# Patient Record
Sex: MALE | Race: WHITE | NOT HISPANIC OR LATINO
[De-identification: names, ages, dates, MRNs, and addresses within clinical notes are randomized per-mention and may not be internally consistent; named-entity substitution may affect disease eponyms.]

---

## 2021-12-03 ENCOUNTER — RESULT REVIEW (OUTPATIENT)
Age: 71
End: 2021-12-03

## 2021-12-13 PROBLEM — Z00.00 ENCOUNTER FOR PREVENTIVE HEALTH EXAMINATION: Status: ACTIVE | Noted: 2021-12-13

## 2021-12-15 ENCOUNTER — APPOINTMENT (OUTPATIENT)
Dept: SURGERY | Facility: CLINIC | Age: 71
End: 2021-12-15
Payer: COMMERCIAL

## 2021-12-15 VITALS
HEART RATE: 71 BPM | HEIGHT: 72 IN | TEMPERATURE: 97.1 F | DIASTOLIC BLOOD PRESSURE: 68 MMHG | WEIGHT: 175 LBS | SYSTOLIC BLOOD PRESSURE: 111 MMHG | BODY MASS INDEX: 23.7 KG/M2

## 2021-12-15 DIAGNOSIS — N40.0 BENIGN PROSTATIC HYPERPLASIA WITHOUT LOWER URINARY TRACT SYMPMS: ICD-10-CM

## 2021-12-15 DIAGNOSIS — Z86.79 PERSONAL HISTORY OF OTHER DISEASES OF THE CIRCULATORY SYSTEM: ICD-10-CM

## 2021-12-15 DIAGNOSIS — Z87.438 PERSONAL HISTORY OF OTHER DISEASES OF MALE GENITAL ORGANS: ICD-10-CM

## 2021-12-15 DIAGNOSIS — Z87.19 PERSONAL HISTORY OF OTHER DISEASES OF THE DIGESTIVE SYSTEM: ICD-10-CM

## 2021-12-15 DIAGNOSIS — K80.20 CALCULUS OF GALLBLADDER W/OUT CHOLECYSTITIS W/OUT OBSTRUCTION: ICD-10-CM

## 2021-12-15 DIAGNOSIS — Z87.39 PERSONAL HISTORY OF OTHER DISEASES OF THE MUSCULOSKELETAL SYSTEM AND CONNECTIVE TISSUE: ICD-10-CM

## 2021-12-15 DIAGNOSIS — Z86.59 PERSONAL HISTORY OF OTHER MENTAL AND BEHAVIORAL DISORDERS: ICD-10-CM

## 2021-12-15 PROCEDURE — 99204 OFFICE O/P NEW MOD 45 MIN: CPT

## 2021-12-15 RX ORDER — IPRATROPIUM BROMIDE 42 UG/1
SPRAY NASAL
Refills: 0 | Status: ACTIVE | COMMUNITY

## 2021-12-15 RX ORDER — FLUTICASONE PROPIONATE 50 MCG
50 SPRAY, SUSPENSION NASAL
Refills: 0 | Status: ACTIVE | COMMUNITY

## 2021-12-15 RX ORDER — CLONAZEPAM 1 MG/1
1 TABLET ORAL
Refills: 0 | Status: ACTIVE | COMMUNITY

## 2021-12-15 RX ORDER — ATORVASTATIN CALCIUM 80 MG/1
80 TABLET, FILM COATED ORAL
Refills: 0 | Status: ACTIVE | COMMUNITY

## 2021-12-15 NOTE — HISTORY OF PRESENT ILLNESS
[de-identified] : 70 yo M here for evaluation of sigmoid colon invasive moderately differentiated adenocarcinoma\par \par CT A/P: Findings suggest an eccentric mass within the proximal sigmoid colon, measuring approximately 4.0 cm in length and 0.9 cm in width.  Probable left lobe hepatic cysts [de-identified] : Patient has h/o chronic pain for which he takes fentanyl and hydrocodone.  He has chronic GI symptoms and can go days without eating or having a bowel movement.  He c/o bloating and constipation with intermittent rectal bleeding. Prior to this most recent c-scope patient had one in 2016 but prep was insufficient.  Per patients  patient required 2 days of prep to have good enough result for the colonoscopy.   C-scope biopsy proven  invasive moderately differentiated adenocarcinoma

## 2021-12-15 NOTE — PHYSICAL EXAM
[Normal Rate and Rhythm] : normal rate and rhythm [Abdominal Masses] : No abdominal masses [No Rash or Lesion] : No rash or lesion [Alert] : alert [Calm] : calm [de-identified] : NAD, well appearing [de-identified] : soft, NT/ND

## 2021-12-15 NOTE — CONSULT LETTER
[Dear  ___] : Dear  [unfilled], [( Thank you for referring [unfilled] for consultation for _____ )] : Thank you for referring [unfilled] for consultation for [unfilled] [Please see my note below.] : Please see my note below. [Consult Closing:] : Thank you very much for allowing me to participate in the care of this patient.  If you have any questions, please do not hesitate to contact me. [Sincerely,] : Sincerely, [FreeTextEntry3] : Mercedes Hamilton

## 2022-01-06 ENCOUNTER — APPOINTMENT (OUTPATIENT)
Dept: SURGERY | Facility: HOSPITAL | Age: 72
End: 2022-01-06

## 2022-01-24 ENCOUNTER — LABORATORY RESULT (OUTPATIENT)
Age: 72
End: 2022-01-24

## 2022-01-24 ENCOUNTER — APPOINTMENT (OUTPATIENT)
Dept: HEMATOLOGY ONCOLOGY | Facility: CLINIC | Age: 72
End: 2022-01-24
Payer: COMMERCIAL

## 2022-01-24 VITALS
SYSTOLIC BLOOD PRESSURE: 109 MMHG | HEART RATE: 70 BPM | BODY MASS INDEX: 23.3 KG/M2 | WEIGHT: 172 LBS | HEIGHT: 72 IN | RESPIRATION RATE: 18 BRPM | OXYGEN SATURATION: 18 % | TEMPERATURE: 98.7 F | DIASTOLIC BLOOD PRESSURE: 66 MMHG

## 2022-01-24 DIAGNOSIS — Z80.8 FAMILY HISTORY OF MALIGNANT NEOPLASM OF OTHER ORGANS OR SYSTEMS: ICD-10-CM

## 2022-01-24 DIAGNOSIS — Z87.891 PERSONAL HISTORY OF NICOTINE DEPENDENCE: ICD-10-CM

## 2022-01-24 PROCEDURE — 99205 OFFICE O/P NEW HI 60 MIN: CPT

## 2022-01-24 RX ORDER — DICYCLOMINE HYDROCHLORIDE 10 MG/1
10 CAPSULE ORAL
Refills: 0 | Status: DISCONTINUED | COMMUNITY
End: 2022-01-24

## 2022-01-26 ENCOUNTER — APPOINTMENT (OUTPATIENT)
Dept: SURGERY | Facility: CLINIC | Age: 72
End: 2022-01-26
Payer: COMMERCIAL

## 2022-01-26 VITALS — TEMPERATURE: 97.7 F | HEART RATE: 63 BPM | DIASTOLIC BLOOD PRESSURE: 63 MMHG | SYSTOLIC BLOOD PRESSURE: 106 MMHG

## 2022-01-26 LAB
CEA SERPL-MCNC: 0.9 NG/ML
DEPRECATED KAPPA LC FREE/LAMBDA SER: 1.38 RATIO
FOLATE SERPL-MCNC: >20 NG/ML
KAPPA LC CSF-MCNC: 1.75 MG/DL
KAPPA LC SERPL-MCNC: 2.42 MG/DL
VIT B12 SERPL-MCNC: 654 PG/ML

## 2022-01-26 PROCEDURE — 99024 POSTOP FOLLOW-UP VISIT: CPT

## 2022-01-26 NOTE — PHYSICAL EXAM
[JVD] : no jugular venous distention  [Normal Breath Sounds] : Normal breath sounds [Normal Heart Sounds] : normal heart sounds [Abdominal Masses] : No abdominal masses [Abdomen Tenderness] : ~T ~M No abdominal tenderness [de-identified] : NAD, well appearing [de-identified] : soft, NT/NDm incisions well healed

## 2022-01-26 NOTE — CONSULT LETTER
[Dear  ___] : Dear  [unfilled], [Courtesy Letter:] : I had the pleasure of seeing your patient, [unfilled], in my office today. [Please see my note below.] : Please see my note below. [Consult Closing:] : Thank you very much for allowing me to participate in the care of this patient.  If you have any questions, please do not hesitate to contact me. [Sincerely,] : Sincerely, [FreeTextEntry1] : He is doing well after his sigmoid colectomy and will follow up again in 2 months.   [FreeTextEntry3] : Mercedes Hamilton

## 2022-01-26 NOTE — HISTORY OF PRESENT ILLNESS
[de-identified] : 71-year-old male s/p sigmoid colectomy s/p initial consultation with Dr. Patel for stage IIa adenocarcinoma of the colon. \par s/p sigmoid colectomy on January 6, 2022 which showed a Stage IIB moderately differentiated adenocarcinoma measuring 2.5 cm with invasion through the muscularis propria into pericolorectal tissue (T3N0Mx) with mismatch repair proteins results showing intact nuclear expression. [de-identified] : He reports that since surgery he has been feeling tired but otherwise feels well.  His bowel movements are improved from his usual state of chronic constipation.  He is ambulating without difficulty and has resumed activities of daily living.  Tolerating a regular diet.  Denies fevers/chills or abdominal pain

## 2022-01-28 NOTE — ASSESSMENT
[FreeTextEntry1] : This a very pleasant 71-year-old gentleman who was recently found to have a stage IIA (T3, N0, M0) resected  colon cancer on 1/6/2022 with an intact MMR panel with a low probability of MSI-H and no evident high risk features.  Given this finding in conjunction with his age and comorbidities it is not likely that he will get substantial benefit from adjuvant chemotherapy.  While single agent capecitabine may be considered the overall benefit is likely to be well short of 5%, especially in the 70 and over age group.  In conjunction with his comorbidities it is likely to be a poor risk to benefit ratio.  I will obtain the appropriate oncological lab work and I will review his pathology personally.  He will return in 3 weeks for follow-up and for further recommendations.  His next scans will be planned for the March–April timeframe.\par \par Thank you very much for allow me to participate in this gentlemen's medical care.  Should you have any questions or concerns please not hesitate to call me directly.

## 2022-01-28 NOTE — REVIEW OF SYSTEMS
[Chest Pain] : chest pain [Constipation] : constipation [Negative] : Allergic/Immunologic [FreeTextEntry5] : Associated with coughing or sneezing, resolved. [FreeTextEntry7] : Chronic secondary to analgesia

## 2022-01-28 NOTE — HISTORY OF PRESENT ILLNESS
[T: ___] : T[unfilled] [N: ___] : N[unfilled] [AJCC Stage: ____] : AJCC Stage: [unfilled] [de-identified] : Mr. Hever Zimmerman is a 71-year-old male who is referred by Dr. Mercedes Hamilton for initial consultation for stage IIa adenocarcinoma of the colon.  \par Mr. Hernández was referred for colonoscopy in December 2021 to evaluate rectal bleeding.  he reports that prior colonoscopy in 2016 was inconclusive because of insufficient prep.  Colonoscopy on December 3, 2021 revealed a small nodular mass in the sigmoid colon at 25 cm.  Pathology confirmed a invasive moderately differentiated adenocarcinoma.  CT of the abdomen pelvis on December 3, 2021 a eccentric mass within the proximal sigmoid colon measuring approximately 4 cm in length and probable  left lobe hepatic cysts.  CT of the chest on January 3, 2022 showed fibrocalcific changes at the right apex with stable fibrotic nodular density which were unchanged since prior CT chest from June 2017 He underwent sigmoidectomy on January 6, 2022 showed a Stage IIB  moderately differentiated adenocarcinoma measuring 2.5 cm with invasion through the muscularis propria into pericolorectal tissue (T3N0Mx) with mismatch repair proteins results showing intact nuclear expression.  He reports history of chronic constipation from pain medication used for bone degeneration.  He is feeling well but had episodes of chest pain associated with coughing or sneezing which resolved.  [0 - No Distress] : Distress Level: 0

## 2022-02-15 ENCOUNTER — APPOINTMENT (OUTPATIENT)
Dept: HEMATOLOGY ONCOLOGY | Facility: CLINIC | Age: 72
End: 2022-02-15
Payer: COMMERCIAL

## 2022-02-15 VITALS
RESPIRATION RATE: 18 BRPM | DIASTOLIC BLOOD PRESSURE: 78 MMHG | HEIGHT: 72 IN | SYSTOLIC BLOOD PRESSURE: 126 MMHG | TEMPERATURE: 97.6 F | OXYGEN SATURATION: 99 % | HEART RATE: 64 BPM | WEIGHT: 172 LBS | BODY MASS INDEX: 23.3 KG/M2

## 2022-02-15 LAB
ALBUMIN MFR SERPL ELPH: 54.4 %
ALBUMIN SERPL-MCNC: 3.8 G/DL
ALBUMIN/GLOB SERPL: 1.2 RATIO
ALPHA1 GLOB MFR SERPL ELPH: 6.6 %
ALPHA1 GLOB SERPL ELPH-MCNC: 0.5 G/DL
ALPHA2 GLOB MFR SERPL ELPH: 14.7 %
ALPHA2 GLOB SERPL ELPH-MCNC: 1 G/DL
B-GLOBULIN MFR SERPL ELPH: 12.2 %
B-GLOBULIN SERPL ELPH-MCNC: 0.8 G/DL
DEPRECATED KAPPA LC FREE/LAMBDA SER: 1.38 RATIO
GAMMA GLOB FLD ELPH-MCNC: 0.8 G/DL
GAMMA GLOB MFR SERPL ELPH: 12.1 %
IGA SER QL IEP: 207 MG/DL
IGG SER QL IEP: 842 MG/DL
IGM SER QL IEP: 71 MG/DL
INTERPRETATION SERPL IEP-IMP: NORMAL
KAPPA LC CSF-MCNC: 1.75 MG/DL
KAPPA LC SERPL-MCNC: 2.42 MG/DL
M PROTEIN SPEC IFE-MCNC: NORMAL
METHYLMALONATE SERPL-SCNC: 217 NMOL/L
PROT SERPL-MCNC: 6.9 G/DL
PROT SERPL-MCNC: 6.9 G/DL

## 2022-02-15 PROCEDURE — 99214 OFFICE O/P EST MOD 30 MIN: CPT

## 2022-03-23 ENCOUNTER — APPOINTMENT (OUTPATIENT)
Dept: SURGERY | Facility: CLINIC | Age: 72
End: 2022-03-23
Payer: COMMERCIAL

## 2022-03-23 VITALS
TEMPERATURE: 96.7 F | HEART RATE: 59 BPM | SYSTOLIC BLOOD PRESSURE: 107 MMHG | WEIGHT: 170 LBS | DIASTOLIC BLOOD PRESSURE: 68 MMHG | BODY MASS INDEX: 23.03 KG/M2 | HEIGHT: 72 IN

## 2022-03-23 DIAGNOSIS — Z85.038 PERSONAL HISTORY OF OTHER MALIGNANT NEOPLASM OF LARGE INTESTINE: ICD-10-CM

## 2022-03-23 PROCEDURE — 99024 POSTOP FOLLOW-UP VISIT: CPT

## 2022-03-23 NOTE — HISTORY OF PRESENT ILLNESS
[de-identified] : 71-year-old male s/p sigmoid colectomy on 1/6 for  Stage IIB moderately differentiated adenocarcinoma (T3N0Mx).  Here for 2 month follow up [de-identified] : He is doing well from a surgical standpoint.  S/o neck pain requiring additional pain medication.  He denies abdominal pain.  Has his usual on and off constipation.

## 2022-03-23 NOTE — CONSULT LETTER
[Dear  ___] : Dear  [unfilled], [Courtesy Letter:] : I had the pleasure of seeing your patient, [unfilled], in my office today. [Please see my note below.] : Please see my note below. [Consult Closing:] : Thank you very much for allowing me to participate in the care of this patient.  If you have any questions, please do not hesitate to contact me. [Sincerely,] : Sincerely, [FreeTextEntry1] : I saw Mr. Zimmerman today for his 2 month follow up following his robotic colectomy in January.  He is doing well and has follow up with Dr. Patel at the end of the month.   [FreeTextEntry3] : Mercedes Hamilton

## 2022-03-23 NOTE — PHYSICAL EXAM
[JVD] : no jugular venous distention  [Normal Breath Sounds] : Normal breath sounds [Normal Rate and Rhythm] : normal rate and rhythm [No Rash or Lesion] : No rash or lesion [Calm] : calm [de-identified] : NAD, well appearing [de-identified] : soft, NT/ND, incisions well healed

## 2022-03-29 ENCOUNTER — APPOINTMENT (OUTPATIENT)
Dept: HEMATOLOGY ONCOLOGY | Facility: CLINIC | Age: 72
End: 2022-03-29
Payer: COMMERCIAL

## 2022-03-29 VITALS
HEIGHT: 72 IN | WEIGHT: 170 LBS | TEMPERATURE: 97.9 F | RESPIRATION RATE: 18 BRPM | OXYGEN SATURATION: 97 % | SYSTOLIC BLOOD PRESSURE: 99 MMHG | DIASTOLIC BLOOD PRESSURE: 59 MMHG | BODY MASS INDEX: 23.03 KG/M2 | HEART RATE: 56 BPM

## 2022-03-29 DIAGNOSIS — K59.00 CONSTIPATION, UNSPECIFIED: ICD-10-CM

## 2022-03-29 PROCEDURE — 99214 OFFICE O/P EST MOD 30 MIN: CPT | Mod: 25

## 2022-03-29 PROCEDURE — 36415 COLL VENOUS BLD VENIPUNCTURE: CPT

## 2022-04-11 PROBLEM — K59.00 CONSTIPATION: Status: ACTIVE | Noted: 2022-04-11

## 2022-04-11 LAB — CEA SERPL-MCNC: 1 NG/ML

## 2022-04-11 NOTE — ASSESSMENT
[FreeTextEntry1] : This a very pleasant 71-year-old gentleman who was recently found to have a stage IIA (T3, N0, M0) resected  colon cancer on 1/6/2022 with an intact MMR panel with a low probability of MSI-H and no evident high risk features.  Given this finding in conjunction with his age and comorbidities it is not likely that he will get substantial benefit from adjuvant chemotherapy.  While single agent capecitabine may be considered the overall benefit is likely to be well short of 5%, especially in the 70 and over age group.  In conjunction with his comorbidities it is likely to be a poor risk to benefit ratio.  I obtained the appropriate oncological lab work and I reviewed his pathology personally.   His postoperative CEA was within normal limits.  He had an opportunity to review the provided patient oriented literature from the initial visit and at this time has decided to take the observational approach. \par Reviewed available labs which were normal.\par In light of the patient's change in bowel movements with alternating constipation and loss of bowel control, we are recommending that he follow-up with his GI.\par Repeat CT chest abdomen pelvis\par Continue routine, age-appropriate, healthcare maintenance \par History of present illness, review of systems, physical exam and treatment plan reviewed with .\par Office visit in 8 weeks or prn for new or worsening symptoms.

## 2022-04-11 NOTE — REVIEW OF SYSTEMS
[Constipation] : constipation [Negative] : Allergic/Immunologic [Chest Pain] : no chest pain [FreeTextEntry7] : Chronic secondary to analgesia, alternating with "loss of bowel function"

## 2022-04-11 NOTE — HISTORY OF PRESENT ILLNESS
[T: ___] : T[unfilled] [N: ___] : N[unfilled] [AJCC Stage: ____] : AJCC Stage: [unfilled] [0 - No Distress] : Distress Level: 0 [de-identified] : Mr. Hever Zimmerman is a 72-year-old male who is referred by Dr. Mercedes Hamilton for initial consultation for stage IIa adenocarcinoma of the colon.  \par Mr. Hernández was referred for colonoscopy in December 2021 to evaluate rectal bleeding.  he reports that prior colonoscopy in 2016 was inconclusive because of insufficient prep.  Colonoscopy on December 3, 2021 revealed a small nodular mass in the sigmoid colon at 25 cm.  Pathology confirmed a invasive moderately differentiated adenocarcinoma.  CT of the abdomen pelvis on December 3, 2021 a eccentric mass within the proximal sigmoid colon measuring approximately 4 cm in length and probable  left lobe hepatic cysts.  CT of the chest on January 3, 2022 showed fibrocalcific changes at the right apex with stable fibrotic nodular density which were unchanged since prior CT chest from June 2017 He underwent sigmoidectomy on January 6, 2022 showed a Stage IIB  moderately differentiated adenocarcinoma measuring 2.5 cm with invasion through the muscularis propria into pericolorectal tissue (T3N0Mx) with mismatch repair proteins results showing intact nuclear expression.  He reports history of chronic constipation from pain medication used for bone degeneration.  He is feeling well but had episodes of chest pain associated with coughing or sneezing which resolved.  [de-identified] : he presents for follow up of Stage IIA Colon Cancer on observation.  He reports having constipation alternating with loss of bowel function.  He reviewed this with his surgeon.  Otherwise he denies complaint.  He denies rash, fever, infections, bleeding, bruising, nausea,vomiting,cough, SOB, chest pain, change in BM, headache or dizziness.

## 2022-06-01 ENCOUNTER — APPOINTMENT (OUTPATIENT)
Dept: HEMATOLOGY ONCOLOGY | Facility: CLINIC | Age: 72
End: 2022-06-01
Payer: MEDICARE

## 2022-06-01 VITALS
OXYGEN SATURATION: 99 % | DIASTOLIC BLOOD PRESSURE: 68 MMHG | TEMPERATURE: 97.6 F | WEIGHT: 170 LBS | RESPIRATION RATE: 18 BRPM | HEIGHT: 72 IN | SYSTOLIC BLOOD PRESSURE: 117 MMHG | BODY MASS INDEX: 23.03 KG/M2 | HEART RATE: 63 BPM

## 2022-06-01 PROCEDURE — 99214 OFFICE O/P EST MOD 30 MIN: CPT

## 2022-06-03 LAB — CEA SERPL-MCNC: 1.5 NG/ML

## 2022-06-04 NOTE — REVIEW OF SYSTEMS
[Chest Pain] : no chest pain [FreeTextEntry7] : Chronic secondary to analgesia, alternating with "loss of bowel function"

## 2022-06-04 NOTE — ASSESSMENT
[FreeTextEntry1] : This a very pleasant 72-year-old gentleman who was found to have a stage IIA (T3, N0, M0) resected  colon cancer on 1/6/2022 with an intact MMR panel with a low probability of MSI-H and no evident high risk features.  Given this finding in conjunction with his age and comorbidities it is not likely that he will get substantial benefit from adjuvant chemotherapy.  While single agent capecitabine may be considered the overall benefit is likely to be well short of 5%, especially in the 70 and over age group.  In conjunction with his comorbidities it is likely to be a poor risk to benefit ratio.  I obtained the appropriate oncological lab work and I reviewed his pathology personally.   His postoperative CEA was within normal limits.  He had an opportunity to review the provided patient oriented literature from the initial visit and at this time has decided to take the observational approach. \par Reviewed available labs which were normal.\par In light of the patient's change in bowel movements with alternating constipation and loss of bowel control, we are recommending that he follow-up with his GI.\par CT CAP on 4/13/2022 showed a 6 mm left lower lobe posterior pleural-based nodule opacity probably postinflammatory in etiology.  Status postcholecystectomy.  Mild biliary tract dilatation.  Features of chronic pancreatitis.  Mild diffuse dilatation of pancreatic duct with pancreatic head intraductal obstructing calculi.  No evidence of acute pancreatitis.  These results were forwarded to his gastroenterologist and will be forwarded again today.  The patient is aware of these findings and he is asked to follow-up with his gastroenterologist.\par Continue routine, age-appropriate, healthcare maintenance \par Office visit in 12 weeks or prn for new or worsening symptoms.

## 2022-06-04 NOTE — HISTORY OF PRESENT ILLNESS
[de-identified] : Mr. Hever Zimmerman is a 72-year-old male who is referred by Dr. Mercedes Hamilton for initial consultation for stage IIa adenocarcinoma of the colon.  \par Mr. Hernández was referred for colonoscopy in December 2021 to evaluate rectal bleeding.  he reports that prior colonoscopy in 2016 was inconclusive because of insufficient prep.  Colonoscopy on December 3, 2021 revealed a small nodular mass in the sigmoid colon at 25 cm.  Pathology confirmed a invasive moderately differentiated adenocarcinoma.  CT of the abdomen pelvis on December 3, 2021 a eccentric mass within the proximal sigmoid colon measuring approximately 4 cm in length and probable  left lobe hepatic cysts.  CT of the chest on January 3, 2022 showed fibrocalcific changes at the right apex with stable fibrotic nodular density which were unchanged since prior CT chest from June 2017 He underwent sigmoidectomy on January 6, 2022 showed a Stage IIB  moderately differentiated adenocarcinoma measuring 2.5 cm with invasion through the muscularis propria into pericolorectal tissue (T3N0Mx) with mismatch repair proteins results showing intact nuclear expression.  He reports history of chronic constipation from pain medication used for bone degeneration.  He is feeling well but had episodes of chest pain associated with coughing or sneezing which resolved.  [de-identified] : he presents for follow up of Stage IIA Colon Cancer on observation.  He reports having constipation alternating with loss of bowel function.  He reviewed this with his surgeon.  Otherwise he denies complaint.  He denies rash, fever, infections, bleeding, bruising, nausea,vomiting,cough, SOB, chest pain, change in BM, headache or dizziness.

## 2022-07-01 ENCOUNTER — APPOINTMENT (OUTPATIENT)
Dept: GASTROENTEROLOGY | Facility: CLINIC | Age: 72
End: 2022-07-01

## 2022-07-01 VITALS
OXYGEN SATURATION: 97 % | WEIGHT: 170 LBS | TEMPERATURE: 97 F | HEIGHT: 72 IN | HEART RATE: 52 BPM | BODY MASS INDEX: 23.03 KG/M2 | DIASTOLIC BLOOD PRESSURE: 68 MMHG | SYSTOLIC BLOOD PRESSURE: 130 MMHG

## 2022-07-01 PROCEDURE — 99204 OFFICE O/P NEW MOD 45 MIN: CPT

## 2022-07-01 RX ORDER — ALFUZOSIN HYDROCHLORIDE 10 MG/1
10 TABLET, EXTENDED RELEASE ORAL
Refills: 0 | Status: DISCONTINUED | COMMUNITY
End: 2022-07-01

## 2022-07-01 NOTE — ASSESSMENT
[FreeTextEntry1] : Will plan on an endoscopic ultrasound for chronic pancreatitis, dilated main PD.  Explained risks/benefits/alternatives including not limited to bleeding, infection, perforation, missed lesion, injury to internal organs, pancreatitis.  Patient understands and agrees, all questions answered.\par \par Would not plan on PD stone removal via ERCP unless symptomatic. No recent episodes of acute pancreatitis, stone removal risk likely outweighs benefit at this time.\par \par Thank you for referring Mr. JAMES.  Please do not hesitate to call to further discuss his/her care.\par \par Note faxed to requesting MD.\par \par

## 2022-07-01 NOTE — HISTORY OF PRESENT ILLNESS
[FreeTextEntry1] : Mr. Zimmerman is a pleasant 72M h/o sigmoid colon cancer s/p resection 1/22, osteoporosis, anxiety, CAD s/p angioplasty x2, cholecystectomy, HTN who is referred by Dr. Patel for abnormal imaging of the pancreas.\par \par He underwent a CT A/P 4/15/22 due to his recent history of colon cancer. He was found to have "features of chronic pancreatitis. Mild diffuse dilatation of the pancreatic duct with pancreatic head intraductal obstructing calculi. No evidence of acute pancreatitis."\par \par He does report an episode of pancreatitis requiring hospitalization at [facility] 5 years ago, presumed due to gallstones, although he states he was drinking at that time. Distant history of smoking 30+ years ago.  Has since quit drinking.\par \par Feels well, no abd pain, no recurrent episodes of pancreatitis.\par \par No weight change.\par \par No other complaints.\par \par Thinks he underwent an upper EUS several years ago with Dr. Fry however does not have records, does not know results, was well prior to any recent imaging.

## 2022-08-19 ENCOUNTER — APPOINTMENT (OUTPATIENT)
Dept: UROLOGY | Facility: CLINIC | Age: 72
End: 2022-08-19

## 2022-08-19 ENCOUNTER — APPOINTMENT (OUTPATIENT)
Dept: RADIATION ONCOLOGY | Facility: CLINIC | Age: 72
End: 2022-08-19

## 2022-08-19 ENCOUNTER — NON-APPOINTMENT (OUTPATIENT)
Age: 72
End: 2022-08-19

## 2022-08-19 VITALS — TEMPERATURE: 97.7 F | HEART RATE: 61 BPM | SYSTOLIC BLOOD PRESSURE: 126 MMHG | DIASTOLIC BLOOD PRESSURE: 76 MMHG

## 2022-08-19 PROCEDURE — 99205 OFFICE O/P NEW HI 60 MIN: CPT | Mod: 57

## 2022-08-19 PROCEDURE — 99204 OFFICE O/P NEW MOD 45 MIN: CPT | Mod: 25

## 2022-08-19 NOTE — HISTORY OF PRESENT ILLNESS
[FreeTextEntry1] : Mr. Hever Zimmerman is a 72-year-old male who is referred for Prostate Cancer by Dr. Castillo\par Onc Hx: \par  He underwent sigmoidectomy on January 6, 2022 showed a Stage IIB moderately differentiated adenocarcinoma measuring 2.5 cm with invasion through the muscularis propria into becca colorectal tissue (T3N0Mx)\par TNM stage: T3, N0 \par AJCC Stage: IIA \par He saw Dr. Patel and is under observation. \par  He presents today for Prostate Adenocarcinoma\par PSA Hx:\par 10/3/2018- 2.40\par 1/29/2020- 2.30\par 5/12/2021- 2.76\par 11/23/2021- 2.74\par \par 7/13/2022 Prostate Biopsy Pathology: \par A. Prostate, Left Base:Benign Prostatic tissue, no evidence of malignancy\par B. Prostate, Left Mid:Benign Prostatic tissue, no evidence of malignancy\par C. Prostate, Left Carlton:Benign Prostatic tissue, no evidence of malignancy\par D. Prostate, Left Lateral Base :Benign Prostatic tissue, no evidence of malignancy\par E. Prostate, Left Mid :Benign Prostatic tissue, no evidence of malignancy\par F. Prostate, Left LAteral Carlton :Benign Prostatic tissue, no evidence of malignancy\par G.Prostate, Right Base: Prostatic Adenocarcinoma, GCS 7 (4+3) noted in 1 out of 1 cores, appx 80% of tissue involved. Mirna Grade 4 comprises 70% of tumor. Grade Group 3\par H. Prostate, Right Lateral Base: Prostatic Adenocarcinoma, GCS 7 (3+4) noted in 1 out of 1 cores, appx 80% of tissue involved. Mirna Grade 4 comprises 10% of tumor. \par I. Prostate, Right Mid: Prostatic Adenocarcinoma, GCS 7 (3+4) noted in 1 out of 1 cores, appx 60% of tissue involved. Mirna Grade 4 comprises 30% of tumor. \par J. Prostate, Right Lateral Mid: Prostatic Adenocarcinoma, GCS 7 (3+4) noted in 2 out of 2 cores, appx 30% of tissue involved. [place] Grade 4 comprises 20% of tumor. Perineural invasion is present \par K.  Prostate, Right Carlton: Prostatic Adenocarcinoma, GCS 6 (3+3) noted in 1 out of 1 cores, appx 5% of tissue involved. Perineural invasion is present \par L.  Prostate, Right Lateral Carlton: Prostatic Adenocarcinoma, GCS 6 (3+3) noted in 1 out of 1 cores, appx 4% of tissue involved.  Perineural invasion is present \par \par Today 8//18/2022 he is here to discuss radiation therapy to the prostate \par He has seen Dr Terrence Carney at [facility] to discuss surgical options.  He would like to see what his option are regarding radiation.  \par Today is is having a lot of bowel issues.  Constipation and some fecal incontinence.  He reports Nocturia x1 and incomplete emptying of urine sensation.  \par He reports that he has been seeing a urologist , a Dr Yon Monique( who has now retired) since 2000 but did not get prostate exams.  at that time he did have hematuria .  \par He started to see Dr ligia Finch who did a prostate exam and found it to be enlarged and did a biopsy as referenced above.  \par PSA as referenced above\par \par EPIC score is 23\par Prostate cancer score is 17

## 2022-08-19 NOTE — LETTER BODY
[Dear  ___] : Dear  [unfilled], [Courtesy Letter:] : I had the pleasure of seeing your patient, [unfilled], in my office today. [Please see my note below.] : Please see my note below. [Consult Closing:] : Thank you very much for allowing me to participate in the care of this patient.  If you have any questions, please do not hesitate to contact me. [Sincerely,] : Sincerely, [FreeTextEntry2] : Eliecer Pedersen MD\par [address]\par [place] [address] [FreeTextEntry3] : Terrence Light MD, FACS\par Urologic Oncology\par Department of Urology\par [facility]\par \par [employer] [facility] \par \par

## 2022-08-19 NOTE — ASSESSMENT
[FreeTextEntry1] : 73yo male with PSA 2.74, Mirna 4+3=7, GG3 prostate cancer\par \par MRI and bone scan for staging\par \par Discussed biopsy findings with patient and treatment options\par Patient is considered unfavorable intermediate risk by NCCN guidelines. \par Standard treatment options including watchful waiting, active surveillance, radical prostatectomy or radiation therapy were discussed. \par Radiation can be given as combined brachytherapy and external beam with or without adjuvant hormone treatment for 6 months.\par Radical prostatectomy is often performed by robot assisted laparoscopic technique. Discussed immediate risks of surgery including bleeding, infection, blood clot or injury to surrounding organs. Discussed long term risks including urinary incontinence and sexual dysfunction. \par Discussed risks of radiation treatment including urinary and bowel symptoms and sexual dysfunction. \par Discussed risks of adjuvant hormone treatment including hot flashes, fatigue, sexual dysfunction. \par He will be meeting with Rad Onc this afternoon\par \par He is leaning towards surgery. Discussed increased risks of surgery due to recent sigmoidectomy. \par Would need medical and cardiac clearance

## 2022-08-19 NOTE — VITALS
[Maximal Pain Intensity: 0/10] : 0/10 [NSAID/Non-Opioid] : NSAID/Non-Opioid [80: Normal activity with effort; some signs or symptoms of disease.] : 80: Normal activity with effort; some signs or symptoms of disease.  [10 - Extreme Distress] : Distress Level: 10 [FreeTextEntry7] : health and wellness discussed with patient

## 2022-08-19 NOTE — PHYSICAL EXAM
[General Appearance - Well Developed] : well developed [General Appearance - Well Nourished] : well nourished [Normal Appearance] : normal appearance [Well Groomed] : well groomed [General Appearance - In No Acute Distress] : no acute distress [Abdomen Soft] : soft [Abdomen Tenderness] : non-tender [Abdomen Hernia] : no hernia was discovered [Costovertebral Angle Tenderness] : no ~M costovertebral angle tenderness [Normal Station and Gait] : the gait and station were normal for the patient's age [] : no rash [No Focal Deficits] : no focal deficits [Oriented To Time, Place, And Person] : oriented to person, place, and time [Affect] : the affect was normal [Mood] : the mood was normal [Not Anxious] : not anxious

## 2022-08-19 NOTE — HISTORY OF PRESENT ILLNESS
[FreeTextEntry1] : This is a 73yo male with history of sigmoid cancer s/p robotic sigmoidectomy 1/2022, recently diagnosed [place] 4+3=7, GG3, prostate cancer, here for evaluation. Last PSA 11/2021 = 2.74. No MRI or other imaging. \par \par 7/13/2022 Prostate Biopsy Pathology: \par A. Prostate, Left Base:Benign Prostatic tissue, no evidence of malignancy\par B. Prostate, Left Mid:Benign Prostatic tissue, no evidence of malignancy\par C. Prostate, Left [place]:Benign Prostatic tissue, no evidence of malignancy\par D. Prostate, Left Lateral Base :Benign Prostatic tissue, no evidence of malignancy\par E. Prostate, Left Mid :Benign Prostatic tissue, no evidence of malignancy\par F. Prostate, Left LAteral [place] :Benign Prostatic tissue, no evidence of malignancy\par G.Prostate, Right Base: Prostatic Adenocarcinoma, GCS 7 (4+3) noted in 1 out of 1 cores, appx 80% of tissue involved. [place] Grade 4 comprises 70% of tumor. Grade Group 3\par H. Prostate, Right Lateral Base: Prostatic Adenocarcinoma, GCS 7 (3+4) noted in 1 out of 1 cores, appx 80% of tissue involved. [place] Grade 4 comprises 10% of tumor. \par I. Prostate, Right Mid: Prostatic Adenocarcinoma, GCS 7 (3+4) noted in 1 out of 1 cores, appx 60% of tissue involved. [place] Grade 4 comprises 30% of tumor. \par J. Prostate, Right Lateral Mid: Prostatic Adenocarcinoma, GCS 7 (3+4) noted in 2 out of 2 cores, appx 30% of tissue involved. [place] Grade 4 comprises 20% of tumor. Perineural invasion is present \par K. Prostate, Right [place]: Prostatic Adenocarcinoma, GCS 6 (3+3) noted in 1 out of 1 cores, appx 5% of tissue involved. Perineural invasion is present \par L. Prostate, Right Lateral [place]: Prostatic Adenocarcinoma, GCS 6 (3+3) noted in 1 out of 1 cores, appx 4% of tissue involved. Perineural invasion is present \par  [Urinary Incontinence] : no urinary incontinence [Erectile Dysfunction] : no Erectile Dysfunction [None] : None

## 2022-08-19 NOTE — REVIEW OF SYSTEMS
[Fatigue] : fatigue [Constipation] : constipation [Nocturia] : nocturia [IPSS Score (0-40): ___] : IPSS score: [unfilled] [Muscle Pain] : muscle pain [Anxiety] : anxiety [Negative] : Allergic/Immunologic

## 2022-08-26 ENCOUNTER — RESULT REVIEW (OUTPATIENT)
Age: 72
End: 2022-08-26

## 2022-08-28 ENCOUNTER — RESULT REVIEW (OUTPATIENT)
Age: 72
End: 2022-08-28

## 2022-08-29 ENCOUNTER — APPOINTMENT (OUTPATIENT)
Dept: GASTROENTEROLOGY | Facility: HOSPITAL | Age: 72
End: 2022-08-29

## 2022-08-31 NOTE — HISTORY OF PRESENT ILLNESS
[FreeTextEntry1] : Mr. Hever Zimmerman is a 72-year-old male who is referred for Prostate Cancer by Dr. Castillo\par Onc Hx: \par  He underwent sigmoidectomy on January 6, 2022 showed a Stage IIB moderately differentiated adenocarcinoma measuring 2.5 cm with invasion through the muscularis propria into becca colorectal tissue (T3N0Mx)\par TNM stage: T3, N0 \par AJCC Stage: IIA \par He saw Dr. Patel and is under observation. \par  He presents today for Prostate Adenocarcinoma\par PSA Hx:\par 10/3/2018- 2.40\par 1/29/2020- 2.30\par 5/12/2021- 2.76\par 11/23/2021- 2.74\par \par 7/13/2022 Prostate Biopsy Pathology: \par A. Prostate, Left Base:Benign Prostatic tissue, no evidence of malignancy\par B. Prostate, Left Mid:Benign Prostatic tissue, no evidence of malignancy\par C. Prostate, Left [place]:Benign Prostatic tissue, no evidence of malignancy\par D. Prostate, Left Lateral Base :Benign Prostatic tissue, no evidence of malignancy\par E. Prostate, Left Mid :Benign Prostatic tissue, no evidence of malignancy\par F. Prostate, Left LAteral [place] :Benign Prostatic tissue, no evidence of malignancy\par G.Prostate, Right Base: Prostatic Adenocarcinoma, GCS 7 (4+3) noted in 1 out of 1 cores, appx 80% of tissue involved. [place] Grade 4 comprises 70% of tumor. Grade Group 3\par H. Prostate, Right Lateral Base: Prostatic Adenocarcinoma, GCS 7 (3+4) noted in 1 out of 1 cores, appx 80% of tissue involved. [place] Grade 4 comprises 10% of tumor. \par I. Prostate, Right Mid: Prostatic Adenocarcinoma, GCS 7 (3+4) noted in 1 out of 1 cores, appx 60% of tissue involved. [place] Grade 4 comprises 30% of tumor. \par J. Prostate, Right Lateral Mid: Prostatic Adenocarcinoma, GCS 7 (3+4) noted in 2 out of 2 cores, appx 30% of tissue involved. [place] Grade 4 comprises 20% of tumor. Perineural invasion is present \par K.  Prostate, Right [place]: Prostatic Adenocarcinoma, GCS 6 (3+3) noted in 1 out of 1 cores, appx 5% of tissue involved. Perineural invasion is present \par L.  Prostate, Right Lateral [place]: Prostatic Adenocarcinoma, GCS 6 (3+3) noted in 1 out of 1 cores, appx 4% of tissue involved.  Perineural invasion is present \par \par Today 8//18/2022 he is here to discuss radiation therapy to the prostate \par He has seen Dr Terrence Carney at [facility] to discuss surgical options.  He would like to see what his option are regarding radiation.  \par Today is is having a lot of bowel issues.  Constipation and some fecal incontinence.  He reports Nocturia x1 and incomplete emptying of urine sensation.  \par He reports that he has been seeing a urologist , a Dr Yon Monique( who has now retired) since 2000 but did not get prostate exams.  at that time he did have hematuria .  \par He started to see Dr ligia Finch who did a prostate exam and found it to be enlarged and did a biopsy as referenced above.  \par PSA as referenced above\par \par EPIC score is 23\par Prostate cancer score is 17\par 9/6/2022\par Today he is here to discuss MRI of prostate\par NEED RESULTS  notified........

## 2022-09-02 ENCOUNTER — RESULT REVIEW (OUTPATIENT)
Age: 72
End: 2022-09-02

## 2022-09-06 ENCOUNTER — OUTPATIENT (OUTPATIENT)
Dept: OUTPATIENT SERVICES | Facility: HOSPITAL | Age: 72
LOS: 1 days | End: 2022-09-06
Payer: MEDICARE

## 2022-09-06 ENCOUNTER — NON-APPOINTMENT (OUTPATIENT)
Age: 72
End: 2022-09-06

## 2022-09-06 ENCOUNTER — APPOINTMENT (OUTPATIENT)
Dept: RADIATION ONCOLOGY | Facility: CLINIC | Age: 72
End: 2022-09-06

## 2022-09-06 ENCOUNTER — RESULT REVIEW (OUTPATIENT)
Age: 72
End: 2022-09-06

## 2022-09-06 DIAGNOSIS — C61 MALIGNANT NEOPLASM OF PROSTATE: ICD-10-CM

## 2022-09-06 PROCEDURE — 88321 CONSLTJ&REPRT SLD PREP ELSWR: CPT

## 2022-09-08 LAB — SURGICAL PATHOLOGY STUDY: SIGNIFICANT CHANGE UP

## 2022-09-09 ENCOUNTER — RESULT REVIEW (OUTPATIENT)
Age: 72
End: 2022-09-09

## 2022-09-15 ENCOUNTER — APPOINTMENT (OUTPATIENT)
Dept: CARDIOLOGY | Facility: CLINIC | Age: 72
End: 2022-09-15

## 2022-09-18 ENCOUNTER — RESULT REVIEW (OUTPATIENT)
Age: 72
End: 2022-09-18

## 2022-09-21 ENCOUNTER — TRANSCRIPTION ENCOUNTER (OUTPATIENT)
Age: 72
End: 2022-09-21

## 2022-09-21 VITALS
TEMPERATURE: 97 F | OXYGEN SATURATION: 98 % | DIASTOLIC BLOOD PRESSURE: 66 MMHG | RESPIRATION RATE: 16 BRPM | SYSTOLIC BLOOD PRESSURE: 105 MMHG | HEIGHT: 72 IN | HEART RATE: 55 BPM | WEIGHT: 178.35 LBS

## 2022-09-21 NOTE — PATIENT PROFILE ADULT - FALL HARM RISK - UNIVERSAL INTERVENTIONS
Bed in lowest position, wheels locked, appropriate side rails in place/Call bell, personal items and telephone in reach/Instruct patient to call for assistance before getting out of bed or chair/Non-slip footwear when patient is out of bed/[place] to call system/Physically safe environment - no spills, clutter or unnecessary equipment/Purposeful Proactive Rounding/Room/bathroom lighting operational, light cord in reach

## 2022-09-21 NOTE — PRE-OP CHECKLIST - WARM FLUIDS/WARM BLANKETS
"Chief Complaint   Patient presents with     Derm Problem     neck area and front chest area and arms. Getting worse and spreading. Has been going on for about one week. Pt has been really stressed.      Medication Reconciliation     should be on meds, but not currently taking any as they got stolen and no insurance for about 2 months. Pt will restart and refill.        BP (!) 154/103 (BP Location: Right arm, Patient Position: Sitting, Cuff Size: Adult Regular)   Pulse 94   Temp 98.3  F (36.8  C) (Oral)   Resp 16   Ht 1.58 m (5' 2.21\")   Wt 77.1 kg (170 lb)   SpO2 100%   BMI 30.89 kg/m        ~ Dandre Moore CMA ([place])  [facility]  Phone: [phone]    " no

## 2022-09-21 NOTE — PRE-OP CHECKLIST - 1.
pt is on baby ASA and had stopped it one week ago- Dr. Benoit from anesthesia notified, no further instructions given at this time

## 2022-09-22 ENCOUNTER — TRANSCRIPTION ENCOUNTER (OUTPATIENT)
Age: 72
End: 2022-09-22

## 2022-09-22 ENCOUNTER — RESULT REVIEW (OUTPATIENT)
Age: 72
End: 2022-09-22

## 2022-09-22 ENCOUNTER — INPATIENT (INPATIENT)
Facility: HOSPITAL | Age: 72
LOS: 0 days | Discharge: HOME | DRG: 707 | End: 2022-09-23
Attending: UROLOGY | Admitting: UROLOGY
Payer: MEDICARE

## 2022-09-22 ENCOUNTER — APPOINTMENT (OUTPATIENT)
Dept: UROLOGY | Facility: HOSPITAL | Age: 72
End: 2022-09-22

## 2022-09-22 DIAGNOSIS — M19.90 UNSPECIFIED OSTEOARTHRITIS, UNSPECIFIED SITE: Chronic | ICD-10-CM

## 2022-09-22 DIAGNOSIS — Z87.19 PERSONAL HISTORY OF OTHER DISEASES OF THE DIGESTIVE SYSTEM: Chronic | ICD-10-CM

## 2022-09-22 DIAGNOSIS — Z98.890 OTHER SPECIFIED POSTPROCEDURAL STATES: Chronic | ICD-10-CM

## 2022-09-22 DIAGNOSIS — Z90.49 ACQUIRED ABSENCE OF OTHER SPECIFIED PARTS OF DIGESTIVE TRACT: Chronic | ICD-10-CM

## 2022-09-22 DIAGNOSIS — C61 MALIGNANT NEOPLASM OF PROSTATE: ICD-10-CM

## 2022-09-22 LAB
BLD GP AB SCN SERPL QL: NEGATIVE — SIGNIFICANT CHANGE UP
RH IG SCN BLD-IMP: POSITIVE — SIGNIFICANT CHANGE UP

## 2022-09-22 PROCEDURE — 88309 TISSUE EXAM BY PATHOLOGIST: CPT | Mod: 26

## 2022-09-22 PROCEDURE — 88305 TISSUE EXAM BY PATHOLOGIST: CPT | Mod: 26

## 2022-09-22 PROCEDURE — 55866 LAPS SURG PRST8ECT RPBIC RAD: CPT

## 2022-09-22 PROCEDURE — 55866 LAPS SURG PRST8ECT RPBIC RAD: CPT | Mod: AS

## 2022-09-22 PROCEDURE — 38571 LAPAROSCOPY LYMPHADENECTOMY: CPT | Mod: AS

## 2022-09-22 PROCEDURE — 38571 LAPAROSCOPY LYMPHADENECTOMY: CPT

## 2022-09-22 DEVICE — SURGIFLO HEMOSTATIC MATRIX KIT: Type: IMPLANTABLE DEVICE | Status: FUNCTIONAL

## 2022-09-22 DEVICE — LIGATING CLIPS WECK HEMOLOK POLYMER LARGE (PURPLE) 6: Type: IMPLANTABLE DEVICE | Status: FUNCTIONAL

## 2022-09-22 DEVICE — SURGICEL 4 X 8": Type: IMPLANTABLE DEVICE | Status: FUNCTIONAL

## 2022-09-22 RX ORDER — ATORVASTATIN CALCIUM 80 MG/1
80 TABLET, FILM COATED ORAL AT BEDTIME
Refills: 0 | Status: DISCONTINUED | OUTPATIENT
Start: 2022-09-22 | End: 2022-09-23

## 2022-09-22 RX ORDER — FLUTICASONE PROPIONATE 50 MCG
1 SPRAY, SUSPENSION NASAL
Qty: 0 | Refills: 0 | DISCHARGE

## 2022-09-22 RX ORDER — ACETAMINOPHEN 500 MG
1000 TABLET ORAL ONCE
Refills: 0 | Status: COMPLETED | OUTPATIENT
Start: 2022-09-22 | End: 2022-09-22

## 2022-09-22 RX ORDER — ATENOLOL 25 MG/1
1 TABLET ORAL
Qty: 0 | Refills: 0 | DISCHARGE

## 2022-09-22 RX ORDER — GABAPENTIN 400 MG/1
300 CAPSULE ORAL ONCE
Refills: 0 | Status: COMPLETED | OUTPATIENT
Start: 2022-09-22 | End: 2022-09-22

## 2022-09-22 RX ORDER — IPRATROPIUM BROMIDE 21 MCG
0.05 AEROSOL, SPRAY (ML) NASAL
Qty: 0 | Refills: 0 | DISCHARGE

## 2022-09-22 RX ORDER — CEFAZOLIN SODIUM 1 G
1000 VIAL (EA) INJECTION EVERY 8 HOURS
Refills: 0 | Status: COMPLETED | OUTPATIENT
Start: 2022-09-22 | End: 2022-09-22

## 2022-09-22 RX ORDER — FENTANYL CITRATE 50 UG/ML
1 INJECTION INTRAVENOUS
Refills: 0 | Status: DISCONTINUED | OUTPATIENT
Start: 2022-09-22 | End: 2022-09-23

## 2022-09-22 RX ORDER — OXYBUTYNIN CHLORIDE 5 MG
5 TABLET ORAL EVERY 8 HOURS
Refills: 0 | Status: DISCONTINUED | OUTPATIENT
Start: 2022-09-22 | End: 2022-09-23

## 2022-09-22 RX ORDER — ENOXAPARIN SODIUM 100 MG/ML
40 INJECTION SUBCUTANEOUS ONCE
Refills: 0 | Status: COMPLETED | OUTPATIENT
Start: 2022-09-22 | End: 2022-09-22

## 2022-09-22 RX ORDER — ENOXAPARIN SODIUM 100 MG/ML
40 INJECTION SUBCUTANEOUS EVERY 24 HOURS
Refills: 0 | Status: DISCONTINUED | OUTPATIENT
Start: 2022-09-23 | End: 2022-09-23

## 2022-09-22 RX ORDER — FENTANYL CITRATE 50 UG/ML
1 INJECTION INTRAVENOUS
Qty: 0 | Refills: 0 | DISCHARGE

## 2022-09-22 RX ORDER — CLONAZEPAM 1 MG
1 TABLET ORAL AT BEDTIME
Refills: 0 | Status: DISCONTINUED | OUTPATIENT
Start: 2022-09-22 | End: 2022-09-23

## 2022-09-22 RX ORDER — CLONAZEPAM 1 MG
1 TABLET ORAL
Qty: 0 | Refills: 0 | DISCHARGE

## 2022-09-22 RX ORDER — OXYCODONE AND ACETAMINOPHEN 5; 325 MG/1; MG/1
2 TABLET ORAL EVERY 8 HOURS
Refills: 0 | Status: DISCONTINUED | OUTPATIENT
Start: 2022-09-22 | End: 2022-09-22

## 2022-09-22 RX ORDER — ATORVASTATIN CALCIUM 80 MG/1
1 TABLET, FILM COATED ORAL
Qty: 0 | Refills: 0 | DISCHARGE

## 2022-09-22 RX ORDER — HYDROCODONE BITARTRATE 50 MG/1
1 CAPSULE, EXTENDED RELEASE ORAL
Qty: 0 | Refills: 0 | DISCHARGE

## 2022-09-22 RX ORDER — HYDROMORPHONE HYDROCHLORIDE 2 MG/ML
1 INJECTION INTRAMUSCULAR; INTRAVENOUS; SUBCUTANEOUS EVERY 4 HOURS
Refills: 0 | Status: DISCONTINUED | OUTPATIENT
Start: 2022-09-22 | End: 2022-09-23

## 2022-09-22 RX ORDER — SODIUM CHLORIDE 9 MG/ML
1000 INJECTION INTRAMUSCULAR; INTRAVENOUS; SUBCUTANEOUS
Refills: 0 | Status: DISCONTINUED | OUTPATIENT
Start: 2022-09-22 | End: 2022-09-23

## 2022-09-22 RX ORDER — SODIUM CHLORIDE 9 MG/ML
500 INJECTION INTRAMUSCULAR; INTRAVENOUS; SUBCUTANEOUS ONCE
Refills: 0 | Status: COMPLETED | OUTPATIENT
Start: 2022-09-22 | End: 2022-09-22

## 2022-09-22 RX ORDER — ATENOLOL 25 MG/1
50 TABLET ORAL DAILY
Refills: 0 | Status: DISCONTINUED | OUTPATIENT
Start: 2022-09-22 | End: 2022-09-23

## 2022-09-22 RX ORDER — L.ACIDOPH/B.ANIMALIS/B.LONGUM 15B CELL
1 CAPSULE ORAL
Qty: 0 | Refills: 0 | DISCHARGE

## 2022-09-22 RX ORDER — ASPIRIN/CALCIUM CARB/MAGNESIUM 324 MG
1 TABLET ORAL
Qty: 0 | Refills: 0 | DISCHARGE

## 2022-09-22 RX ADMIN — SODIUM CHLORIDE 80 MILLILITER(S): 9 INJECTION INTRAMUSCULAR; INTRAVENOUS; SUBCUTANEOUS at 16:12

## 2022-09-22 RX ADMIN — GABAPENTIN 300 MILLIGRAM(S): 400 CAPSULE ORAL at 07:23

## 2022-09-22 RX ADMIN — SODIUM CHLORIDE 80 MILLILITER(S): 9 INJECTION INTRAMUSCULAR; INTRAVENOUS; SUBCUTANEOUS at 21:11

## 2022-09-22 RX ADMIN — Medication 100 MILLIGRAM(S): at 16:12

## 2022-09-22 RX ADMIN — Medication 5 MILLIGRAM(S): at 21:10

## 2022-09-22 RX ADMIN — SODIUM CHLORIDE 500 MILLILITER(S): 9 INJECTION INTRAMUSCULAR; INTRAVENOUS; SUBCUTANEOUS at 22:16

## 2022-09-22 RX ADMIN — ENOXAPARIN SODIUM 40 MILLIGRAM(S): 100 INJECTION SUBCUTANEOUS at 07:24

## 2022-09-22 RX ADMIN — SODIUM CHLORIDE 80 MILLILITER(S): 9 INJECTION INTRAMUSCULAR; INTRAVENOUS; SUBCUTANEOUS at 21:03

## 2022-09-22 RX ADMIN — Medication 400 MILLIGRAM(S): at 13:12

## 2022-09-22 RX ADMIN — Medication 1000 MILLIGRAM(S): at 07:24

## 2022-09-22 RX ADMIN — ATORVASTATIN CALCIUM 80 MILLIGRAM(S): 80 TABLET, FILM COATED ORAL at 22:16

## 2022-09-22 RX ADMIN — Medication 1000 MILLIGRAM(S): at 13:48

## 2022-09-22 RX ADMIN — Medication 100 MILLIGRAM(S): at 23:49

## 2022-09-22 NOTE — PRE-ANESTHESIA EVALUATION ADULT - NSANTHOSAYNRD_GEN_A_CORE
No. CORI screening performed.  STOP BANG Legend: 0-2 = LOW Risk; 3-4 = INTERMEDIATE Risk; 5-8 = HIGH Risk

## 2022-09-22 NOTE — PRE-ANESTHESIA EVALUATION ADULT - NSANTHPMHFT_GEN_ALL_CORE
PMH:   CAD s/p MURRAY to LAD 1998 and 2008  HTN  Chronic pain 2/2 pancreatitis (fent path, dilaudid  breakthrough)  COPD  hx Colon cancer s/p colectomy  IBS  Gastroparesis  CKD2  BPH  Anxiety    PSH  Cholecystectomy  Colectomy  T12 decompression  R shoulder surgery  R hip surgery

## 2022-09-22 NOTE — BRIEF OPERATIVE NOTE - NSICDXBRIEFPROCEDURE_GEN_ALL_CORE_FT
PROCEDURES:  Robotic-assisted prostatectomy with pelvic lymph node dissection 22-Sep-2022 16:40:47  Fazal Baeza

## 2022-09-22 NOTE — CONSULT NOTE ADULT - SUBJECTIVE AND OBJECTIVE BOX
Pain Management Consult Note - [facility] [phone]    Chief Complaint: penile pain     HPI: Patient seen and examined today. This is a 71 y/o male PMH of CAD, HLD, COPD, MEEK prostate cancer s/p radical robotic prostatectomy. Patient in PACU, reports endorsing moderate 6 out of 10 pain that he states is overall controlled. At home, patient reports taking Fentanyl patch 50 mcg/hr every 72 hours, states he has one on currently and applied it Tuesday night at 10 pm. Patient also reports taking hydrocodone-acetaminophen 10 mg po but reports only taking this about once a week. Patient sees Dr. Selma Davis for outpatient pain management which he reports is for chronic back pain. Patient denies side effects from current pain regimen. Patient is istop positive for clonazepam 1 mg po q6h and fentanyl 50 mcg/hr patch, hydrocodone-acet  mg po bid last filled 8/2022.    Pain is _X__ sharp ____dull ___burning ___achy ___ Intensity: ____ mild _X_mod ___severe     Location __X__surgical site ____cervical _____lumbar ____abd ____upper ext____lower ext    Worse with _X___activity __X__movement _____physical therapy___ Rest    Improved with __X__medication __X__rest ____physical therapy    ROS: Const:  N__febrile   Eyes:___ENT:___CV: N___chest pain  Resp: __N__sob  GI:__N_nausea _N__vomiting N___abd pain ___npo ___clears __full diet __bm  :___ Musk: _Y__pain ___spasm  Skin:___ Neuro:  N___sedation___confusion___N numbness ___weakness __N_paresth  Psych:__anxiety  Endo:___ Heme:___Allergy:_NKDA________, Y___all others reviewed and negative    PAST MEDICAL & SURGICAL HISTORY:  Prostate cancer  MEEK (generalized anxiety disorder)  CAD (coronary artery disease)  3 stent  COPD (chronic obstructive pulmonary disease)  HTN (hypertension)  HLD (hyperlipidemia)  History of hip surgery  H/O discectomy  H/O shoulder surgery  History of colon surgery  History of IBS  OA (osteoarthritis)  History of cholecystectomy    SH: N___Tobacco   __N_Alcohol                          FH:FAMILY HISTORY: NO PERTINENT MEDICAL HISTORY NOTED IN FIRST DEGREE RELATIVES      ceFAZolin   IVPB 1000 milliGRAM(s) IV Intermittent every 8 hours  sodium chloride 0.9%. 1000 milliLiter(s) IV Continuous <Continuous>      T(C): 36.3 (09-22-22 @ 11:54), Max: 36.3 (09-22-22 @ 11:54)  HR: 53 (09-22-22 @ 12:19) (50 - 54)  BP: 114/58 (09-22-22 @ 12:19) (114/58 - 132/61)  RR: 29 (09-22-22 @ 12:19) (12 - 29)  SpO2: 99% (09-22-22 @ 12:19) (97% - 99%)  Wt(kg): --    T(C): 36.3 (09-22-22 @ 11:54), Max: 36.3 (09-22-22 @ 11:54)  HR: 53 (09-22-22 @ 12:19) (50 - 54)  BP: 114/58 (09-22-22 @ 12:19) (114/58 - 132/61)  RR: 29 (09-22-22 @ 12:19) (12 - 29)  SpO2: 99% (09-22-22 @ 12:19) (97% - 99%)  Wt(kg): --    T(C): 36.3 (09-22-22 @ 11:54), Max: 36.3 (09-22-22 @ 11:54)  HR: 53 (09-22-22 @ 12:19) (50 - 54)  BP: 114/58 (09-22-22 @ 12:19) (114/58 - 132/61)  RR: 29 (09-22-22 @ 12:19) (12 - 29)  SpO2: 99% (09-22-22 @ 12:19) (97% - 99%)  Wt(kg): --    PHYSICAL EXAM:  Gen Appearance: __X_no acute distress __X_appropriate        Neuro: ___SILT feet____ EOM Intact Psych: AAOX_3_, X___mood/affect appropriate        Eyes: _X__conjunctiva WNL  ___X__ Pupils equal and round        ENT: __X_ears and nose atraumatic_X__ Hearing grossly intact        Neck: __X_trachea midline, no visible masses ___thyroid without palpable mass    Resp: __X_Nml WOB____No tactile fremitus ___clear to auscultation    Cardio: __X_extremities free from edema ____pedal pulses palpable    GI/Abdomen: ___soft _____ Nontender____X__Nondistended_____HSM    Lymphatic: ___no palpable nodes in neck  ___no palpable nodes calves and feet    Skin/Wound: ___Incision, X___Dressing c/d/i,   ____surrounding tissues soft,  ___drain/chest tube present____    Muscular: EHL ___/5  Gastrocnemius___/5    __X_absent clubbing/cyanosis      ASSESSMENT: This is a 72y old Male with a history of CAD, HLD, COPD, MEEK prostate cancer s/p radical robotic prostatectomy.    Recommended Treatment PLAN:  1. Continue home dose Fentanyl patch 50mgh/hr every 72 hours. Please remove old fentanyl patch prior to applying new one, and apply new patch 72 hours after patient applied last patch  2. Consider hydrocodone-acetaminophen  mg po q8h PRN severe pain  3. Consider Dilaudid 1 mg IVP q4h PRN breakthrough pain  HOLD OPIOIDS FRO SEDATION, RR<10, O2SAT <93%, SBP <96  Plan discussed with Dr. Souza, pt seen and examined by him on PM rounds

## 2022-09-23 ENCOUNTER — TRANSCRIPTION ENCOUNTER (OUTPATIENT)
Age: 72
End: 2022-09-23

## 2022-09-23 VITALS
OXYGEN SATURATION: 96 % | SYSTOLIC BLOOD PRESSURE: 116 MMHG | HEART RATE: 73 BPM | DIASTOLIC BLOOD PRESSURE: 55 MMHG | RESPIRATION RATE: 17 BRPM | TEMPERATURE: 99 F

## 2022-09-23 LAB
ANION GAP SERPL CALC-SCNC: 12 MMOL/L — SIGNIFICANT CHANGE UP (ref 5–17)
BASOPHILS # BLD AUTO: 0.05 K/UL — SIGNIFICANT CHANGE UP (ref 0–0.2)
BASOPHILS NFR BLD AUTO: 0.5 % — SIGNIFICANT CHANGE UP (ref 0–2)
BILIRUB SERPL-MCNC: 0.9 MG/DL — SIGNIFICANT CHANGE UP (ref 0.2–1.2)
BUN SERPL-MCNC: 11 MG/DL — SIGNIFICANT CHANGE UP (ref 7–23)
CALCIUM SERPL-MCNC: 8.1 MG/DL — LOW (ref 8.4–10.5)
CHLORIDE SERPL-SCNC: 104 MMOL/L — SIGNIFICANT CHANGE UP (ref 96–108)
CO2 SERPL-SCNC: 21 MMOL/L — LOW (ref 22–31)
CREAT SERPL-MCNC: 0.86 MG/DL — SIGNIFICANT CHANGE UP (ref 0.5–1.3)
EGFR: 92 ML/MIN/1.73M2 — SIGNIFICANT CHANGE UP
EOSINOPHIL # BLD AUTO: 0.03 K/UL — SIGNIFICANT CHANGE UP (ref 0–0.5)
EOSINOPHIL NFR BLD AUTO: 0.3 % — SIGNIFICANT CHANGE UP (ref 0–6)
GLUCOSE SERPL-MCNC: 110 MG/DL — HIGH (ref 70–99)
HCT VFR BLD CALC: 33.8 % — LOW (ref 39–50)
HGB BLD-MCNC: 11.5 G/DL — LOW (ref 13–17)
IMM GRANULOCYTES NFR BLD AUTO: 0.3 % — SIGNIFICANT CHANGE UP (ref 0–0.9)
INR BLD: 1.15 — SIGNIFICANT CHANGE UP (ref 0.88–1.16)
LYMPHOCYTES # BLD AUTO: 2.28 K/UL — SIGNIFICANT CHANGE UP (ref 1–3.3)
LYMPHOCYTES # BLD AUTO: 22.4 % — SIGNIFICANT CHANGE UP (ref 13–44)
MCHC RBC-ENTMCNC: 33.5 PG — SIGNIFICANT CHANGE UP (ref 27–34)
MCHC RBC-ENTMCNC: 34 GM/DL — SIGNIFICANT CHANGE UP (ref 32–36)
MCV RBC AUTO: 98.5 FL — SIGNIFICANT CHANGE UP (ref 80–100)
MELD SCORE WITH DIALYSIS: 21 POINTS — SIGNIFICANT CHANGE UP
MELD SCORE WITHOUT DIALYSIS: 8 POINTS — SIGNIFICANT CHANGE UP
MONOCYTES # BLD AUTO: 1.29 K/UL — HIGH (ref 0–0.9)
MONOCYTES NFR BLD AUTO: 12.7 % — SIGNIFICANT CHANGE UP (ref 2–14)
NEUTROPHILS # BLD AUTO: 6.49 K/UL — SIGNIFICANT CHANGE UP (ref 1.8–7.4)
NEUTROPHILS NFR BLD AUTO: 63.8 % — SIGNIFICANT CHANGE UP (ref 43–77)
NRBC # BLD: 0 /100 WBCS — SIGNIFICANT CHANGE UP (ref 0–0)
PLATELET # BLD AUTO: 190 K/UL — SIGNIFICANT CHANGE UP (ref 150–400)
POTASSIUM SERPL-MCNC: 4.7 MMOL/L — SIGNIFICANT CHANGE UP (ref 3.5–5.3)
POTASSIUM SERPL-SCNC: 4.7 MMOL/L — SIGNIFICANT CHANGE UP (ref 3.5–5.3)
PROTHROM AB SERPL-ACNC: 13.7 SEC — HIGH (ref 10.5–13.4)
RBC # BLD: 3.43 M/UL — LOW (ref 4.2–5.8)
RBC # FLD: 12.4 % — SIGNIFICANT CHANGE UP (ref 10.3–14.5)
SODIUM SERPL-SCNC: 137 MMOL/L — SIGNIFICANT CHANGE UP (ref 135–145)
WBC # BLD: 10.17 K/UL — SIGNIFICANT CHANGE UP (ref 3.8–10.5)
WBC # FLD AUTO: 10.17 K/UL — SIGNIFICANT CHANGE UP (ref 3.8–10.5)

## 2022-09-23 PROCEDURE — S2900: CPT

## 2022-09-23 PROCEDURE — 80048 BASIC METABOLIC PNL TOTAL CA: CPT

## 2022-09-23 PROCEDURE — 86850 RBC ANTIBODY SCREEN: CPT

## 2022-09-23 PROCEDURE — 86901 BLOOD TYPING SEROLOGIC RH(D): CPT

## 2022-09-23 PROCEDURE — 36415 COLL VENOUS BLD VENIPUNCTURE: CPT

## 2022-09-23 PROCEDURE — 88309 TISSUE EXAM BY PATHOLOGIST: CPT

## 2022-09-23 PROCEDURE — 88305 TISSUE EXAM BY PATHOLOGIST: CPT

## 2022-09-23 PROCEDURE — 85025 COMPLETE CBC W/AUTO DIFF WBC: CPT

## 2022-09-23 PROCEDURE — C1889: CPT

## 2022-09-23 PROCEDURE — 86900 BLOOD TYPING SEROLOGIC ABO: CPT

## 2022-09-23 RX ORDER — SODIUM CHLORIDE 9 MG/ML
500 INJECTION INTRAMUSCULAR; INTRAVENOUS; SUBCUTANEOUS ONCE
Refills: 0 | Status: COMPLETED | OUTPATIENT
Start: 2022-09-23 | End: 2022-09-23

## 2022-09-23 RX ADMIN — SODIUM CHLORIDE 80 MILLILITER(S): 9 INJECTION INTRAMUSCULAR; INTRAVENOUS; SUBCUTANEOUS at 05:52

## 2022-09-23 RX ADMIN — FENTANYL CITRATE 1 PATCH: 50 INJECTION INTRAVENOUS at 15:04

## 2022-09-23 RX ADMIN — SODIUM CHLORIDE 500 MILLILITER(S): 9 INJECTION INTRAMUSCULAR; INTRAVENOUS; SUBCUTANEOUS at 05:52

## 2022-09-23 RX ADMIN — ENOXAPARIN SODIUM 40 MILLIGRAM(S): 100 INJECTION SUBCUTANEOUS at 05:52

## 2022-09-23 NOTE — DISCHARGE NOTE PROVIDER - NSDCMRMEDTOKEN_GEN_ALL_CORE_FT
aspirin 81 mg oral delayed release tablet: 1 tab(s) orally once a day  atenolol 50 mg oral tablet: 1 tab(s) orally once a day  atorvastatin 80 mg oral tablet: 1 tab(s) orally once a day  clonazePAM 1 mg oral tablet: 1 tab(s) orally once a day  dicyclomine 10 mg oral capsule: 2 cap(s) orally 4 times a day, As Needed  fentaNYL 50 mcg/hr transdermal film, extended release: 1 film(s) transdermal every 72 hours  Flonase 50 mcg/inh nasal spray: 1 spray(s) nasal once a day  HYDROcodone 10 mg oral capsule, extended release: 1 cap(s) orally every 12 hours  ipratropium nasal: 0.05  nasal once a day  Probiotic Formula oral capsule: 1 cap(s) orally once a day

## 2022-09-23 NOTE — DISCHARGE NOTE PROVIDER - INSTRUCTIONS
Advance diet as tolerated, activity as tolerated. Continue w/ clear liquid diet and slowly advance w/ montanez liquids.

## 2022-09-23 NOTE — DISCHARGE NOTE NURSING/CASE MANAGEMENT/SOCIAL WORK - NSDCPEFALRISK_GEN_ALL_CORE
For information on Fall & Injury Prevention, visit: [email or web address] OR  [email or web address] OR  https://www.cdc.gov/steadi/patient.html

## 2022-09-23 NOTE — DISCHARGE NOTE PROVIDER - NSDCFUADDINST_GEN_ALL_CORE_FT
-No heavy lifting or straining. Jacobo to leg bag, Stay well hydrated. If fever >100.4 or any change or worsening of symptoms please call doctor or report to ED. Make follow up appointment with Dr Light.     -You may disconnect your jacobo catheter from the drainage bag, and let it drain freely while showering.  Make sure your incision sites are clean and dry after showering, it is not necessary to scrub, just let water and soap wash over incision sites.    -Continue your pain medication regiment from your Pain management team.  *** YOU MAY continue your home doses of pain medication as needed***    -No heavy lifting or straining. Jacobo to leg bag, Stay well hydrated. If fever >100.4 or any change or worsening of symptoms please call doctor or report to ED. Make follow up appointment with Dr Light.     -You may disconnect your jacobo catheter from the drainage bag, and let it drain freely while showering.  Make sure your incision sites are clean and dry after showering, it is not necessary to scrub, just let water and soap wash over incision sites.    -Continue your pain medication regiment from your Pain management team.

## 2022-09-23 NOTE — DISCHARGE NOTE PROVIDER - NSDCFUSCHEDAPPT_GEN_ALL_CORE_FT
Daniel Patel  [facility]  [facility] [address]  Scheduled Appointment: 10/25/2022

## 2022-09-23 NOTE — DISCHARGE NOTE PROVIDER - CARE PROVIDER_API CALL
Terrence Light)  Urology  [address], [facility], [place] [address]  Phone: [phone]  Fax: [phone]  Follow Up Time: 1 week

## 2022-09-23 NOTE — DISCHARGE NOTE PROVIDER - HOSPITAL COURSE
Patient is a 72y old  Male who presents with a chief complaint of prostate cancer (23 Sep 2022 06:03)      HPI:      Patient is a 72M w/ CaP S/p scheduled RALP.  Patient tolerated procedure well VSS, HDS, and afebrile.     9/23: Patient had no acute overnight events, patient continues to be VSS, HDS, and afebrile.  Pending Pain management final recs. Will be discharged w/ Lopez catheter and will follow up w/ Dr. Light in 7-10 days.       Vital Signs Last 24 Hrs  T(C): 37.4 (23 Sep 2022 05:32), Max: 37.4 (23 Sep 2022 05:32)  T(F): 99.3 (23 Sep 2022 05:32), Max: 99.3 (23 Sep 2022 05:32)  HR: 66 (23 Sep 2022 05:32) (50 - 66)  BP: 94/57 (23 Sep 2022 05:32) (94/57 - 132/61)  BP(mean): 92 (22 Sep 2022 13:59) (76 - 92)  RR: 17 (23 Sep 2022 05:32) (12 - 33)  SpO2: 96% (23 Sep 2022 05:32) (95% - 99%)    Parameters below as of 23 Sep 2022 05:32  Patient On (Oxygen Delivery Method): room air      I&O's Summary    22 Sep 2022 07:01  -  23 Sep 2022 07:00  --------------------------------------------------------  IN: 4250 mL / OUT: 2100 mL / NET: 2150 mL        PE:  Gen:  Abd:  :  MIGUEL ÁNGEL:    LABS:                        11.5   10.17 )-----------( 190      ( 23 Sep 2022 06:22 )             33.8     09-23    137  |  104  |  11  ----------------------------<  110<H>  4.7   |  21<L>  |  0.86    Ca    8.1<L>      23 Sep 2022 06:22    TPro  x   /  Alb  x   /  TBili  0.9  /  DBili  x   /  AST  x   /  ALT  x   /  AlkPhos  x   09-23    PT/INR - ( 23 Sep 2022 06:22 )   PT: 13.7 sec;   INR: 1.15            Cultures      A/P Patient is a 72y old  Male who presents with a chief complaint of prostate cancer (23 Sep 2022 06:03)      HPI:      Patient is a 72M w/ CaP S/p scheduled RALP.  Patient tolerated procedure well VSS, HDS, and afebrile.     9/23: Patient had no acute overnight events, patient continues to be VSS, HDS, and afebrile.  Pending Pain management final recs. Will be discharged w/ Lopez catheter and will follow up w/ Dr. Light in 7-10 days.       Vital Signs Last 24 Hrs  T(C): 37.4 (23 Sep 2022 05:32), Max: 37.4 (23 Sep 2022 05:32)  T(F): 99.3 (23 Sep 2022 05:32), Max: 99.3 (23 Sep 2022 05:32)  HR: 66 (23 Sep 2022 05:32) (50 - 66)  BP: 94/57 (23 Sep 2022 05:32) (94/57 - 132/61)  BP(mean): 92 (22 Sep 2022 13:59) (76 - 92)  RR: 17 (23 Sep 2022 05:32) (12 - 33)  SpO2: 96% (23 Sep 2022 05:32) (95% - 99%)    Parameters below as of 23 Sep 2022 05:32  Patient On (Oxygen Delivery Method): room air      I&O's Summary    22 Sep 2022 07:01  -  23 Sep 2022 07:00  --------------------------------------------------------  IN: 4250 mL / OUT: 2100 mL / NET: 2150 mL    LABS:                        11.5   10.17 )-----------( 190      ( 23 Sep 2022 06:22 )             33.8     09-23    137  |  104  |  11  ----------------------------<  110<H>  4.7   |  21<L>  |  0.86    Ca    8.1<L>      23 Sep 2022 06:22    TPro  x   /  Alb  x   /  TBili  0.9  /  DBili  x   /  AST  x   /  ALT  x   /  AlkPhos  x   09-23    PT/INR - ( 23 Sep 2022 06:22 )   PT: 13.7 sec;   INR: 1.15

## 2022-09-23 NOTE — DISCHARGE NOTE NURSING/CASE MANAGEMENT/SOCIAL WORK - PATIENT PORTAL LINK FT
You can access the FollowMyHealth Patient Portal offered by [facility] by registering at the following website: [email or web address]. By joining [email or web address] FollowMyHealth portal, you will also be able to view your health information using other applications (apps) compatible with our system.

## 2022-09-23 NOTE — PROGRESS NOTE ADULT - ASSESSMENT
72M hx Htn, hld, copd, anxiety, IBS, OA, s/p choly, chronic pancreatitis, prostate ca s/p RALP 9/22    Plan:  -c/w jacobo  -Monitor I's and O's  -OOB  -SCD's  -ancef  -cld  -f/u pain management recs   
73yo male with PMH of HTN, HLD, COPD, IBS, OA, chronic pancreatitis s/p RALP POD#1

## 2022-09-23 NOTE — DISCHARGE NOTE PROVIDER - NSDCCPTREATMENT_GEN_ALL_CORE_FT
PRINCIPAL PROCEDURE  Procedure: Robotic-assisted prostatectomy with pelvic lymph node dissection  Findings and Treatment:

## 2022-09-23 NOTE — PROGRESS NOTE ADULT - SUBJECTIVE AND OBJECTIVE BOX
Pain Management Progress Note - [facility] [phone]    HPI: Patient seen and examined today. Patient reports pain is well controlled this AM. Patient reports endorsing some surgical site pain and abdominal pain with movement, but overall reports it has been adequately controlled. Patient reports pain does increase with movement and walking. Patient has not required doses of Norco for pain or Dilaudid IVP. Patient denies side effects from current pain regimen.     Pertinent PMH: Pain at: ___Back ___Neck___Knee ___Hip ___Shoulder __X_ Opioid tolerance    Pain is __X_ sharp ____dull ___burning ___achy ___ Intensity: ____ mild __X__mod ___X_severe     Location __X___surgical site _____cervical _____lumbar ____abd _____upper ext____lower ext    Worse with __X__activity __X__movement _____physical therapy___ Rest    Improved with _X___medication __X__rest ____physical therapy    PAST MEDICAL & SURGICAL HISTORY:  Prostate cancer  MEEK (generalized anxiety disorder)  CAD (coronary artery disease)  3 stent  COPD (chronic obstructive pulmonary disease)  HTN (hypertension)  HLD (hyperlipidemia)  History of hip surgery  H/O discectomy  H/O shoulder surgery  History of colon surgery  History of IBS  OA (osteoarthritis)  History of cholecystectomy    MEDICATIONS:  thrombin 5,000 Unit(s) Vial (Rx Quick Charge)  sodium chloride 0.9% Bolus  hydrocodone  5 mG/acetaminophen 325 mG  sodium chloride 0.9% Bolus  oxybutynin  hydrocodone  5 mG/acetaminophen 325 mG  clonazePAM  Tablet  atorvastatin  ATENolol  Tablet  HYDROmorphone  Injectable  oxycodone    5 mG/acetaminophen 325 mG  fentaNYL   Patch  50 MICROgram(s)/Hr  acetaminophen   IVPB ..  enoxaparin Injectable  ceFAZolin   IVPB  sodium chloride 0.9%.  gabapentin  enoxaparin Injectable  acetaminophen     Tablet ..    ROS: Const:  _N__febrile   Eyes:___ENT:___CV: __N_chest pain  Resp: ___N_sob  GI:_N__nausea _N__vomiting __N__abd pain ___npo ___clears ___full diet __bm  :___ Musk: __Y_pain ___spasm  Skin:___ Neuro:  _N__sedation___confusion_N___ numbness ___weakness __N_paresthesia  Psych:___anxiety  Endo:___ Heme:___Allergy:_NKDA__      09-23 @ 06:220.86 mg/dL  Hemoglobin: 11.5 g/dL (09-23 @ 06:22  T(C): 37.2 (09-23-22 @ 13:46), Max: 37.4 (09-23-22 @ 05:32)  HR: 73 (09-23-22 @ 13:46) (56 - 73)  BP: 116/55 (09-23-22 @ 13:46) (94/57 - 116/55)  RR: 17 (09-23-22 @ 13:46) (16 - 18)  SpO2: 96% (09-23-22 @ 13:46) (95% - 97%)  Wt(kg): --     PHYSICAL EXAM:  Gen Appearance: _X__no acute distress __X_appropriate       Neuro: ___SILT feet____ EOM Intact Psych: AAOX_3_, X___mood/affect appropriate        Eyes: __X_conjunctiva WNL  _X____ Pupils equal and round        ENT: __X_ears and nose atraumatic___X Hearing grossly intact        Neck: _X__trachea midline, no visible masses ___thyroid without palpable mass    Resp: _X__Nml WOB____No tactile fremitus ___clear to auscultation    Cardio: __X_extremities free from edema ____pedal pulses palpable    GI/Abdomen: ___soft _____ Nontender_____X_Nondistended_____HSM    Lymphatic: ___no palpable nodes in neck  ___no palpable nodes calves and feet    Skin/Wound: ___Incision, ___Dressing c/d/i,   ____surrounding tissues soft,  ___drain/chest tube present____    Muscular: EHL ___/5  Gastrocnemius___/5    __X_absent clubbing/cyanosis         ASSESSMENT:  This is a 72y old Male with a history of CAD, HLD, COPD, MEEK prostate cancer POD 1 s/p radical robotic prostatectomy, doing well.    Recommended Treatment PLAN:  1. Continue home dose Fentanyl patch 50mgh/hr every 72 hours. Please remove old fentanyl patch prior to applying new one, and apply new patch 72 hours after patient applied last patch  2. Consider continuing hydrocodone-acetaminophen  mg po q8h PRN severe pain  3. Consider Dilaudid 1 mg IVP q4h PRN breakthrough pain  HOLD OPIOIDS FRO SEDATION, RR<10, O2SAT <93%, SBP <96  Plan discussed with Dr. Souza  
INTERVAL HPI/OVERNIGHT EVENTS:  No acute events overnight.    VITALS:    T(F): 99.3 (09-23-22 @ 05:32), Max: 99.3 (09-23-22 @ 05:32)  HR: 66 (09-23-22 @ 05:32) (50 - 66)  BP: 94/57 (09-23-22 @ 05:32) (94/57 - 132/61)  RR: 17 (09-23-22 @ 05:32) (12 - 33)  SpO2: 96% (09-23-22 @ 05:32) (95% - 99%)  Wt(kg): --    I&O's Detail    22 Sep 2022 07:01  -  23 Sep 2022 06:03  --------------------------------------------------------  IN:    IV PiggyBack: 50 mL    IV PiggyBack: 100 mL    Oral Fluid: 480 mL    sodium chloride 0.9%: 3120 mL    Sodium Chloride 0.9% Bolus: 500 mL  Total IN: 4250 mL    OUT:    Blood Loss (mL): 150 mL    Indwelling Catheter - Urethral (mL): 1950 mL  Total OUT: 2100 mL    Total NET: 2150 mL          MEDICATIONS:    ANTIBIOTICS:      PAIN CONTROL:  clonazePAM  Tablet 1 milliGRAM(s) Oral at bedtime  fentaNYL   Patch  50 MICROgram(s)/Hr 1 Patch Transdermal every 72 hours  hydrocodone  5 mG/acetaminophen 325 mG 2 Tablet(s) Oral <User Schedule> PRN  HYDROmorphone  Injectable 1 milliGRAM(s) IV Push every 4 hours PRN       MEDS:  oxybutynin 5 milliGRAM(s) Oral every 8 hours PRN      HEME/ONC  enoxaparin Injectable 40 milliGRAM(s) SubCutaneous every 24 hours        PHYSICAL EXAM:  General: No acute distress.  Alert and Oriented  Abdominal Exam: soft, non-tender, non-distended    Exam: jacobo in place draining clear, yellow urine       LABS:                  
  UROLOGY POST OP NOTE (PAGER # [phone])    PROCEDURE: RALP    T(C): 36.3 (09-22-22 @ 14:13), Max: 36.3 (09-22-22 @ 11:54)  HR: 56 (09-22-22 @ 14:13) (50 - 58)  BP: 106/61 (09-22-22 @ 14:13) (106/56 - 132/61)  RR: 16 (09-22-22 @ 14:13) (12 - 33)  SpO2: 95% (09-22-22 @ 14:13) (95% - 99%)  Wt(kg): --    Subjective: pain controlled. Denies CP, SOB, N/V, fevers, chills    ON PE: awake and alert    Abdomen: nondistended. Tenderness appropriate for sx. Incisions dry and intact    : no suprapubic/ CVAT. FC intact draining light pink urine              A/P:

## 2022-09-27 PROBLEM — F41.1 GENERALIZED ANXIETY DISORDER: Chronic | Status: ACTIVE | Noted: 2022-09-21

## 2022-09-27 PROBLEM — C61 MALIGNANT NEOPLASM OF PROSTATE: Chronic | Status: ACTIVE | Noted: 2022-09-21

## 2022-09-27 PROBLEM — J44.9 CHRONIC OBSTRUCTIVE PULMONARY DISEASE, UNSPECIFIED: Chronic | Status: ACTIVE | Noted: 2022-09-21

## 2022-09-27 PROBLEM — E78.5 HYPERLIPIDEMIA, UNSPECIFIED: Chronic | Status: ACTIVE | Noted: 2022-09-21

## 2022-09-27 PROBLEM — I10 ESSENTIAL (PRIMARY) HYPERTENSION: Chronic | Status: ACTIVE | Noted: 2022-09-21

## 2022-09-27 PROBLEM — I25.10 ATHEROSCLEROTIC HEART DISEASE OF NATIVE CORONARY ARTERY WITHOUT ANGINA PECTORIS: Chronic | Status: ACTIVE | Noted: 2022-09-21

## 2022-09-27 LAB — SURGICAL PATHOLOGY STUDY: SIGNIFICANT CHANGE UP

## 2022-09-28 ENCOUNTER — NON-APPOINTMENT (OUTPATIENT)
Age: 72
End: 2022-09-28

## 2022-09-28 DIAGNOSIS — I25.119 ATHEROSCLEROTIC HEART DISEASE OF NATIVE CORONARY ARTERY WITH UNSPECIFIED ANGINA PECTORIS: ICD-10-CM

## 2022-09-28 DIAGNOSIS — E78.5 HYPERLIPIDEMIA, UNSPECIFIED: ICD-10-CM

## 2022-09-28 DIAGNOSIS — C61 MALIGNANT NEOPLASM OF PROSTATE: ICD-10-CM

## 2022-09-28 DIAGNOSIS — J44.9 CHRONIC OBSTRUCTIVE PULMONARY DISEASE, UNSPECIFIED: ICD-10-CM

## 2022-09-28 DIAGNOSIS — K21.9 GASTRO-ESOPHAGEAL REFLUX DISEASE WITHOUT ESOPHAGITIS: ICD-10-CM

## 2022-09-28 DIAGNOSIS — K58.9 IRRITABLE BOWEL SYNDROME WITHOUT DIARRHEA: ICD-10-CM

## 2022-09-28 DIAGNOSIS — K86.1 OTHER CHRONIC PANCREATITIS: ICD-10-CM

## 2022-10-03 ENCOUNTER — APPOINTMENT (OUTPATIENT)
Dept: UROLOGY | Facility: CLINIC | Age: 72
End: 2022-10-03

## 2022-10-03 PROCEDURE — 99024 POSTOP FOLLOW-UP VISIT: CPT

## 2022-10-07 NOTE — ASSESSMENT
[FreeTextEntry1] : Patient is a 72 year male who is s/p robotic radical prostatectomy.  Doing well\par tolerating diet \par incisions intact\par \par A/P\par 1. prostate cancer\par s/p robotic RP\par jacobo removed \par to fu with Dr. Light\par

## 2022-10-07 NOTE — PHYSICAL EXAM
[General Appearance - Well Developed] : well developed [Normal Appearance] : normal appearance [General Appearance - In No Acute Distress] : no acute distress [Abdomen Soft] : soft [FreeTextEntry1] : incisions well healed [Edema] : no peripheral edema [] : no respiratory distress [Respiration, Rhythm And Depth] : normal respiratory rhythm and effort [Oriented To Time, Place, And Person] : oriented to person, place, and time

## 2022-10-25 ENCOUNTER — APPOINTMENT (OUTPATIENT)
Dept: HEMATOLOGY ONCOLOGY | Facility: CLINIC | Age: 72
End: 2022-10-25

## 2022-10-25 ENCOUNTER — RESULT REVIEW (OUTPATIENT)
Age: 72
End: 2022-10-25

## 2022-10-25 VITALS
HEART RATE: 66 BPM | TEMPERATURE: 98.1 F | WEIGHT: 315 LBS | HEIGHT: 72 IN | DIASTOLIC BLOOD PRESSURE: 74 MMHG | OXYGEN SATURATION: 97 % | BODY MASS INDEX: 42.66 KG/M2 | SYSTOLIC BLOOD PRESSURE: 111 MMHG | RESPIRATION RATE: 18 BRPM

## 2022-10-25 PROCEDURE — 99214 OFFICE O/P EST MOD 30 MIN: CPT

## 2022-10-25 RX ORDER — MELOXICAM 15 MG/1
15 TABLET ORAL
Qty: 30 | Refills: 0 | Status: ACTIVE | COMMUNITY
Start: 2022-08-18

## 2022-11-01 LAB — CEA SERPL-MCNC: 1.3 NG/ML

## 2022-11-02 NOTE — HISTORY OF PRESENT ILLNESS
[T: ___] : T[unfilled] [N: ___] : N[unfilled] [AJCC Stage: ____] : AJCC Stage: [unfilled] [0 - No Distress] : Distress Level: 0 [de-identified] : Mr. Hever Zimmerman is a 72-year-old male who is referred by Dr. Mercedes Hamilton for initial consultation for stage IIa adenocarcinoma of the colon.  \par Mr. Hernández was referred for colonoscopy in December 2021 to evaluate rectal bleeding.  he reports that prior colonoscopy in 2016 was inconclusive because of insufficient prep.  Colonoscopy on December 3, 2021 revealed a small nodular mass in the sigmoid colon at 25 cm.  Pathology confirmed a invasive moderately differentiated adenocarcinoma.  CT of the abdomen pelvis on December 3, 2021 a eccentric mass within the proximal sigmoid colon measuring approximately 4 cm in length and probable  left lobe hepatic cysts.  CT of the chest on January 3, 2022 showed fibrocalcific changes at the right apex with stable fibrotic nodular density which were unchanged since prior CT chest from June 2017 He underwent sigmoidectomy on January 6, 2022 showed a Stage IIB  moderately differentiated adenocarcinoma measuring 2.5 cm with invasion through the muscularis propria into pericolorectal tissue (T3N0Mx) with mismatch repair proteins results showing intact nuclear expression.  He reports history of chronic constipation from pain medication used for bone degeneration.  He is feeling well but had episodes of chest pain associated with coughing or sneezing which resolved.  [de-identified] : he presents for follow up of Stage IIA Colon Cancer on observation.  He is status post robotic assisted laparoscopic radical prostatectomy with pelvic lymph node dissection.  Pathology revealed a pT3aN0 [place] 4+3 prostate cancer.  Bone scan done on 9/2/2022 was negative he is experiencing incontinence and impotence.  He is being monitored by urology.  He is status post EUS on 8/29/2022 for chronic pancreatitis. he denies rash, fever, infections, bleeding, bruising, nausea,vomiting,cough, SOB, chest pain, change in BM, headache or dizziness.

## 2022-11-02 NOTE — ASSESSMENT
[FreeTextEntry1] : This a very pleasant 72-year-old gentleman who was found to have a stage IIA (T3, N0, M0) resected  colon cancer on 1/6/2022 with an intact MMR panel with a low probability of MSI-H and no evident high risk features.  Given this finding in conjunction with his age and comorbidities it is not likely that he will get substantial benefit from adjuvant chemotherapy.  While single agent capecitabine may be considered the overall benefit is likely to be well short of 5%, especially in the 70 and over age group.  In conjunction with his comorbidities it is likely to be a poor risk to benefit ratio.  I obtained the appropriate oncological lab work and I reviewed his pathology personally.   His postoperative CEA was within normal limits.  He had an opportunity to review the provided patient oriented literature from the initial visit and at this time has decided to take the observational approach. \par Reviewed available labs which were normal.\par In light of the patient's change in bowel movements with alternating constipation and loss of bowel control, we are recommending that he follow-up with his GI.\par CT CAP on 4/13/2022 showed a 6 mm left lower lobe posterior pleural-based nodule opacity probably postinflammatory in etiology.  Status postcholecystectomy.  Mild biliary tract dilatation.  Features of chronic pancreatitis.  Mild diffuse dilatation of pancreatic duct with pancreatic head intraductal obstructing calculi.  No evidence of acute pancreatitis.  These results were forwarded to his gastroenterologist and will be forwarded again today.  The patient is aware of these findings and he is asked to follow-up with his gastroenterologist.\par Plan:\par Discussed recent finding of prostate cancer being followed by urology.\par In light of recent colon and prostate cancer diagnosis sees I have referred him to genetic counseling.\par Reviewed available labs which were normal\par We will reimage in approximately 3 month with CT chest and abdomen pelvis\par Continue routine, age-appropriate, healthcare maintenance \par History of present illness, review of systems, physical exam and treatment plan reviewed with Dr. Chrissie Herrera\par Office visit in 12 weeks or prn for new or worsening symptoms.

## 2022-11-04 ENCOUNTER — APPOINTMENT (OUTPATIENT)
Dept: UROLOGY | Facility: CLINIC | Age: 72
End: 2022-11-04

## 2022-11-04 VITALS
HEIGHT: 72 IN | DIASTOLIC BLOOD PRESSURE: 75 MMHG | SYSTOLIC BLOOD PRESSURE: 122 MMHG | BODY MASS INDEX: 23.03 KG/M2 | WEIGHT: 170 LBS | HEART RATE: 67 BPM

## 2022-11-04 PROCEDURE — 99024 POSTOP FOLLOW-UP VISIT: CPT

## 2022-11-04 NOTE — ASSESSMENT
[FreeTextEntry1] : 73yo male with PSA 2.74, Mirna 4+3=7, GG3 prostate cancer\par s/p RALP, PLND 9/22/22\par Final path pT3aN0, GG3, negative margins\par PSA today\par Improving appropriately\par Continue Kegels\par F/u 3 months

## 2022-11-04 NOTE — HISTORY OF PRESENT ILLNESS
[None] : None [FreeTextEntry1] : This is a 73yo male with history of sigmoid cancer s/p robotic sigmoidectomy 1/2022, recently diagnosed [place] 4+3=7, GG3, prostate cancer, here for evaluation. Last PSA 11/2021 = 2.74. No MRI or other imaging. \par \par 7/13/2022 Prostate Biopsy Pathology: \par A. Prostate, Left Base:Benign Prostatic tissue, no evidence of malignancy\par B. Prostate, Left Mid:Benign Prostatic tissue, no evidence of malignancy\par C. Prostate, Left [place]:Benign Prostatic tissue, no evidence of malignancy\par D. Prostate, Left Lateral Base :Benign Prostatic tissue, no evidence of malignancy\par E. Prostate, Left Mid :Benign Prostatic tissue, no evidence of malignancy\par F. Prostate, Left LAteral [place] :Benign Prostatic tissue, no evidence of malignancy\par G.Prostate, Right Base: Prostatic Adenocarcinoma, GCS 7 (4+3) noted in 1 out of 1 cores, appx 80% of tissue involved. [place] Grade 4 comprises 70% of tumor. Grade Group 3\par H. Prostate, Right Lateral Base: Prostatic Adenocarcinoma, GCS 7 (3+4) noted in 1 out of 1 cores, appx 80% of tissue involved. [place] Grade 4 comprises 10% of tumor. \par I. Prostate, Right Mid: Prostatic Adenocarcinoma, GCS 7 (3+4) noted in 1 out of 1 cores, appx 60% of tissue involved. [place] Grade 4 comprises 30% of tumor. \par J. Prostate, Right Lateral Mid: Prostatic Adenocarcinoma, GCS 7 (3+4) noted in 2 out of 2 cores, appx 30% of tissue involved. Mirna Grade 4 comprises 20% of tumor. Perineural invasion is present \par K. Prostate, Right [place]: Prostatic Adenocarcinoma, GCS 6 (3+3) noted in 1 out of 1 cores, appx 5% of tissue involved. Perineural invasion is present \par L. Prostate, Right Lateral [place]: Prostatic Adenocarcinoma, GCS 6 (3+3) noted in 1 out of 1 cores, appx 4% of tissue involved. Perineural invasion is present \par \par 11/4/22 postop visit s/p RALP, PLND 9/22\par Path: pT3aN0, [place] 4+3=7, GG3, negative margins\par Urinary control is improving\par No erections yet  [Urinary Incontinence] : urinary incontinence [Erectile Dysfunction] : Erectile Dysfunction

## 2022-11-04 NOTE — LETTER BODY
[Dear  ___] : Dear  [unfilled], [Courtesy Letter:] : I had the pleasure of seeing your patient, [unfilled], in my office today. [Please see my note below.] : Please see my note below. [Consult Closing:] : Thank you very much for allowing me to participate in the care of this patient.  If you have any questions, please do not hesitate to contact me. [Sincerely,] : Sincerely, [FreeTextEntry2] : Eliecer Pedersen MD\par [address]\par [place] [address] [FreeTextEntry3] : Terrence Light MD, FACS\par Urologic Oncology\par Department of Urology\par [facility]\par \par [employer] [facility] \par \par

## 2022-11-05 LAB — PSA SERPL-MCNC: <0.01 NG/ML

## 2022-11-07 ENCOUNTER — NON-APPOINTMENT (OUTPATIENT)
Age: 72
End: 2022-11-07

## 2023-01-31 ENCOUNTER — RESULT REVIEW (OUTPATIENT)
Age: 73
End: 2023-01-31

## 2023-02-03 ENCOUNTER — APPOINTMENT (OUTPATIENT)
Dept: UROLOGY | Facility: CLINIC | Age: 73
End: 2023-02-03
Payer: MEDICARE

## 2023-02-03 VITALS
BODY MASS INDEX: 23.03 KG/M2 | HEART RATE: 69 BPM | HEIGHT: 72 IN | WEIGHT: 170 LBS | DIASTOLIC BLOOD PRESSURE: 64 MMHG | SYSTOLIC BLOOD PRESSURE: 105 MMHG

## 2023-02-03 PROCEDURE — 99214 OFFICE O/P EST MOD 30 MIN: CPT

## 2023-02-03 NOTE — LETTER BODY
[Dear  ___] : Dear  [unfilled], [Courtesy Letter:] : I had the pleasure of seeing your patient, [unfilled], in my office today. [Please see my note below.] : Please see my note below. [Consult Closing:] : Thank you very much for allowing me to participate in the care of this patient.  If you have any questions, please do not hesitate to contact me. [Sincerely,] : Sincerely, [FreeTextEntry2] : Eliecer Pedersen MD\par [address]\par [place] [address] [FreeTextEntry3] : Terrence Light MD, FACS\par Urologic Oncology\par Department of Urology\par [facility]\par \par [employer] [facility] \par \par

## 2023-02-03 NOTE — HISTORY OF PRESENT ILLNESS
[Erectile Dysfunction] : Erectile Dysfunction [None] : None [FreeTextEntry1] : This is a 73yo male with history of sigmoid cancer s/p robotic sigmoidectomy 1/2022, recently diagnosed [place] 4+3=7, GG3, prostate cancer, here for evaluation. Last PSA 11/2021 = 2.74. No MRI or other imaging. \par \par 11/4/22 postop visit s/p RALP, PLND 9/22\par Path: pT3aN0, [place] 4+3=7, GG3, negative margins\par Urinary control is improving\par No erections yet \par \par 2/3/23 here for f/u.\par Last PSA undetectable\par Good urinary control \par No erections yet, interested in trying PDE5i

## 2023-02-03 NOTE — ASSESSMENT
[FreeTextEntry1] : 73yo male with PSA 2.74, [place] 4+3=7, GG3 prostate cancer\par s/p RALP, PLND 9/22/22\par Final path pT3aN0, GG3, negative margins\par Postop PSA was undetectable\par PSA today\par Recovering well\par Continue Kegels\par Trial of sildenafil 50mg prn for ED\par F/u 3 months

## 2023-02-06 LAB — PSA SERPL-MCNC: <0.01 NG/ML

## 2023-02-08 ENCOUNTER — NON-APPOINTMENT (OUTPATIENT)
Age: 73
End: 2023-02-08

## 2023-02-13 ENCOUNTER — APPOINTMENT (OUTPATIENT)
Dept: HEMATOLOGY ONCOLOGY | Facility: CLINIC | Age: 73
End: 2023-02-13
Payer: MEDICARE

## 2023-02-13 ENCOUNTER — RESULT REVIEW (OUTPATIENT)
Age: 73
End: 2023-02-13

## 2023-02-13 VITALS
RESPIRATION RATE: 18 BRPM | BODY MASS INDEX: 23.16 KG/M2 | HEIGHT: 72 IN | OXYGEN SATURATION: 98 % | HEART RATE: 64 BPM | WEIGHT: 171 LBS | DIASTOLIC BLOOD PRESSURE: 66 MMHG | SYSTOLIC BLOOD PRESSURE: 109 MMHG | TEMPERATURE: 97.7 F

## 2023-02-13 PROCEDURE — 36415 COLL VENOUS BLD VENIPUNCTURE: CPT

## 2023-02-13 PROCEDURE — 99213 OFFICE O/P EST LOW 20 MIN: CPT | Mod: 25

## 2023-02-20 NOTE — ASSESSMENT
[FreeTextEntry1] : 72-year-old gentleman who was found to have a stage IIA (T3, N0, M0) resected  colon cancer on 1/6/2022 with an intact MMR panel with a low probability of MSI-H and no evident high risk features.  Given this finding in conjunction with his age and comorbidities it is not likely that he will get substantial benefit from adjuvant chemotherapy.  While single agent capecitabine may be considered the overall benefit is likely to be well short of 5%, especially in the 70 and over age group.  \par \par \par Of note he has a hx of [place] 4+3 prostate cancer s/p robotic assisted laparoscopic radical prostatectomy with  \par Bone scan done on 9/2/2022 was negative.  \par \par CT CAP 2/1/23  no evidence of metastatic disease or pathologic adenopathy. Evidence of chronic pancreatitis with chronic large intraductal calcification and associated ductal dilatation.\par \par Plan:\par - Continue to monitor CEA/PSA\par - Continue follow up with urology for the management of prostate cancer. Last seen 2/3/23. PSA has been undetectable. \par - Genetics? \par - Reviewed recent CT CAP 2/1/23.---annual  imaging. \par - follow upwith GI for surveillance colonoscopies\par - Continue to follow up with pain management for low back pain. S/p multiple epidurals. Sees monthly. No evidence of new etiology on recent CT CAP \par - RTC in 3  months

## 2023-02-20 NOTE — REVIEW OF SYSTEMS
[Constipation] : constipation [Negative] : Allergic/Immunologic [Joint Pain] : joint pain [Chest Pain] : no chest pain [FreeTextEntry9] : Lower back pain

## 2023-02-20 NOTE — HISTORY OF PRESENT ILLNESS
[T: ___] : T[unfilled] [N: ___] : N[unfilled] [AJCC Stage: ____] : AJCC Stage: [unfilled] [0 - No Distress] : Distress Level: 0 [de-identified] : Mr. Hever Zimmerman is a 72-year-old male who is referred by Dr. Mercedes Hamilton for initial consultation for stage IIa adenocarcinoma of the colon.  \par Mr. Hernández was referred for colonoscopy in December 2021 to evaluate rectal bleeding. \par   Colonoscopy on December 3, 2021 revealed a small nodular mass in the sigmoid colon at 25 cm.  Pathology confirmed a invasive moderately differentiated adenocarcinoma.  CT of the abdomen pelvis on December 3, 2021 a eccentric mass within the proximal sigmoid colon measuring approximately 4 cm in length and probable  left lobe hepatic cysts.  CT of the chest on January 3, 2022 showed fibrocalcific changes at the right apex with stable fibrotic nodular density which were unchanged since prior CT chest from June 2017 \par \par He underwent sigmoidectomy on January 6, 2022 showed a Stage IIB  moderately differentiated adenocarcinoma measuring 2.5 cm with invasion through the muscularis propria into pericolorectal tissue (T3N0Mx) with mismatch repair proteins results showing intact nuclear expression.  Chemotherapy was  deferred. He has been under surveillance [de-identified] : Patient presents today for routine follow up for Stage II Colon Cancer on observation. 1/2022\par He is also status post robotic assisted laparoscopic radical prostatectomy with pelvic lymph node dissection pathology revealed a pT3aN0 [place] 4+3 prostate cancer. 9/2022\par Bone scan done on 9/2/2022 was negative. \par He continues to follow with urology Dr. Edwards. PSA has been undetectable. Last seen 2/3/23 \par He is s/p recent CT CAP 2/1/23 revealing no evidence of metastatic disease or pathologic adenopathy. Evidence of chronic pancreatitis with chronic large intraductal calcification and associated ductal dilatation.\par

## 2023-02-23 ENCOUNTER — RESULT REVIEW (OUTPATIENT)
Age: 73
End: 2023-02-23

## 2023-03-05 LAB — CEA SERPL-MCNC: 1.5 NG/ML

## 2023-03-07 ENCOUNTER — APPOINTMENT (OUTPATIENT)
Dept: HEMATOLOGY ONCOLOGY | Facility: CLINIC | Age: 73
End: 2023-03-07

## 2023-03-07 ENCOUNTER — NON-APPOINTMENT (OUTPATIENT)
Age: 73
End: 2023-03-07

## 2023-03-09 NOTE — DISCUSSION/SUMMARY
[FreeTextEntry1] : REASON FOR CONSULT\par Hever Zimmerman is a 72-year-old male referred by Dr. Chrissie Herrera for cancer genetic counseling and risk assessment due to a personal and family history of cancer. Mr. Zimmerman was seen on 3/7/2023 at which time medical and family history was ascertained and a pedigree constructed. \par \par RELEVANT MEDICAL HISTORY\par Mr. Zimmerman was diagnosed with colon cancer on 12/3/2021 at age 71. Pathology report revealed invasive moderately differentiated adenocarcinoma of the colon. Consequently, he underwent a sigmoidectomy on 1/6/2022. MMR proteins were intact, and chemotherapy was deferred. \par \par Mr. Zimmerman was diagnosed with prostate cancer at age 71. [place] score was 4 + 3 and he had a laparoscopic radical prostatectomy. \par \par OTHER MEDICAL AND SURGICAL HISTORY:\par •	Medical History: acute pancreatitis, erectile dysfunction, gallstones, anxiety, gastritis, hypertension, irritable bowel, osteoporosis\par •	Surgical History: angioplasty – 3 stents, laparoscopic cholecystectomy, hip surgery, laparoscopic sigmoidectomy, lumbar discectomy, rotator cuff repair\par \par CANCER SCREENING HISTORY:  \par Colon:\par •	Colonoscopy: 4-year frequency, most recent reported in 2021\par o	Patient reported an unknown polyp history\par •	Upper Endoscopy: 8/29/2022 d/t pancreatitis, negative\par Prostate:\par •	PSA: annual, most recent on 2/3/2023, negative\par Skin:  \par •	FBSE: as-needed frequency, most recent reported about 2 years ago\par •	Lesions biopsied/removed: None\par \par SOCIAL HISTORY:\par •	Tobacco-product use: Formerly smoked for about 20 years\par •	Environmental exposures: None\par \par FAMILY HISTORY:\par Maternal ancestry was reported as [place] and paternal ancestry was reported as [place]. A detailed family history of cancer was ascertained, see below and scanned chart for pedigree. \par \par To Ms. Zimmerman’s knowledge no one in the family has had germline testing for cancer susceptibility. Ashkenazi [employer] ancestry was denied. Consanguinity was denied. \par 	\par RISK ASSESSMENT:\par Mr. Zimmerman’s personal and family history may be suggestive of a hereditary cancer syndrome given his colon cancer diagnosis at age 71 and his prostate cancer diagnosis at age 71, and his maternal grandfather’s prostate cancer in his 70s. Additionally, he has very limited information regarding his paternal family history. We discussed that Mr. Zimmerman does not meet Medicare criteria for genetic testing given his personal and family history of cancer. We therefore discussed [facility]’s patient pay price of $250 and patient assistance programs. Mr. Zimmerman was interested in pursuing genetic testing using [facility]’s patient pay price, and we recommended genetic testing for genes associated with common hereditary cancers given the limited paternal family history. This test analyzes 47 genes: APC, ANISHA, AXIN2, BARD1, BMPR1A, BRCA1, BRCA2, BRIP1, CDH1, CDK4, CDKN2A, CHEK2, CTNNA1, DICER1, EPCAM, GREM1, HOXB13, KIT, MEN1, MLH1, MSH2, MSH3, MSH6, MUTYH, NBN, NF1, NTHL1, PALB2, PDGFRA, PMS2, POLD1, POLE, PTEN, RAD50, RAD51C, RAD51D, SDHA, SDHB, SDHC, SDHD, SMAD4, SMARCA4, STK11, TP53, TSC1, TSC2, VHL.\par \par The risks, benefits and limitations of genetic testing were discussed with Mr. Zimmerman. In addition, we discussed the purpose of genetic testing and possible test results (positive, negative, inconclusive) along with associated medical management options and psychosocial implications. It was explained that risk assessment is based upon medical and family history as provided and may change in the future should new information be obtained. \par \par Following our discussion, Mr. Zimmerman consented to the above-mentioned genetic testing panel. Blood was drawn in our laboratory and sent to [facility] today.\par \par PLAN:\par \par 1.	Blood drawn today will be sent to [facility] for analysis. \par 2.	We will contact Mr. Zimmerman to schedule a follow-up appointment once the results are available. Results generally return in 2-3 weeks. \par \par For any additional questions please call Cancer Genetics at [phone]. \par \par \par Bianca Corrales MS, [facility]\par Genetic Counselor, Cancer Genetics\par \par \par CC: Chrissie Herrera MD

## 2023-03-15 ENCOUNTER — FORM ENCOUNTER (OUTPATIENT)
Age: 73
End: 2023-03-15

## 2023-03-27 ENCOUNTER — NON-APPOINTMENT (OUTPATIENT)
Age: 73
End: 2023-03-27

## 2023-03-27 NOTE — DISCUSSION/SUMMARY
[FreeTextEntry1] : REASON FOR CONSULT\par Hever Zimmerman is a 72-year-old male who was contacted on 3/27/2023 for a discussion regarding his negative genetic testing results related to hereditary cancer predisposition. This session was conducted via telephone. \par \par Mr. Zimmerman was originally seen by the Cancer Genetics Service on 3/7/2023 for hereditary cancer predisposition risk assessment due to a personal and family history of cancer. At that time, Mr. Zimmerman decided to pursue genetic testing for genes associated with common hereditary cancers offered by [facility].\par \par TEST RESULTS: NEGATIVE\par NO pathogenic (disease-causing) variants or variants of uncertain significance were detected in any of the following genes (47): APC, ANISHA, AXIN2, BARD1, BMPR1A, BRCA1, BRCA2, BRIP1, CDH1, CDK4, CDKN2A, CHEK2, CTNNA1, DICER1, EPCAM, GREM1, HOXB13, KIT, MEN1, MLH1, MSH2, MSH3, MSH6, MUTYH, NBN, NF1, NTHL1, PALB2, PDGFRA, PMS2, POLD1, POLE, PTEN, RAD50, RAD51C, RAD51D, SDHA, SDHB, SDHC, SDHD, SMAD4, SMARCA4, STK11, TP53, TSC1, TSC2, VHL.\par \par RESULTS INTERPRETATION AND ASSESSMENT:\par Given Mr. Zimmerman’s personal and current reported family history of cancer, and his negative genetic test results, the following screening guidelines and risk-reducing recommendations were discussed:\par \par COLON: \par •	Long-term management and surveillance should be based on Mr. Zimmerman’s post-treatment protocol as recommended by his oncologist.\par \par PROSTATE: \par •	Long-term management and surveillance should be based on Mr. Zimmerman’s post-treatment protocol as recommended by his oncologist.\par \par OTHER:\par •	In the absence of other indications, Mr. Zimmerman should practice age-appropriate cancer screening of other organ systems as recommended for the general population.\par \par We also discussed the limitations of negative results:\par 1.	The cause of Mr. Zimmerman’s personal and family history of cancer remains unknown. The cancer(s) may have developed randomly, or due to environmental factors.  \par 2.	This negative result does not completely rule out a hereditary basis for the reported personal and/or family history due to limitations in technology or a variant being present in an unidentified gene. \par 3.	Variants in other genes would not be identified by this analysis, so this negative result does not rule out the likelihood of having a mutation in a different hereditary cancer gene or the possibility of ever developing cancer.\par 4.	It is possible there is a hereditary cancer predisposition gene mutation in the family, but the patient did not inherit it. \par \par We informed Mr. Zimmerman that our knowledge of genetics and inherited cancer conditions is changing rapidly. Therefore, we recommended that Mr. Zimmerman contact our office, every 2 to 3 years, to discuss relevant advances in cancer genetics.  We emphasized the importance of re-contacting us with updates regarding his personal and family history of cancer as well as any updates regarding additional cancer genetic test results performed for the patient and/or family members.  Such updates could possibly change our risk assessment and recommendations. \par \par PLAN:\par 1.	These results do not change Mr. Zimmerman’s medical management. Long-term management and surveillance should be based on the patient’s on- or post-treatment protocol as recommended by his oncologist (and general population guidelines for other cancers).\par 2.	Patient informed consult note(s) will be available through their [facility] patient portal and genetic test results will be released via [facility]’s portal.\par 3.	Mr. Zimmerman was encouraged to contact us every 2-3 years to discuss relevant advances in cancer genetics, or sooner if there are any changes in his personal or family history of cancer.\par \par \par For any additional questions please call Cancer Genetics at [phone]. \par \par \par Bianca Corrales MS, [facility] [place]\par Genetic Counselor, Cancer Genetics\par \par \par CC: Yo Herrera MD

## 2023-06-06 ENCOUNTER — APPOINTMENT (OUTPATIENT)
Dept: HEMATOLOGY ONCOLOGY | Facility: CLINIC | Age: 73
End: 2023-06-06
Payer: MEDICARE

## 2023-06-06 ENCOUNTER — RESULT REVIEW (OUTPATIENT)
Age: 73
End: 2023-06-06

## 2023-06-06 VITALS
HEART RATE: 63 BPM | RESPIRATION RATE: 18 BRPM | DIASTOLIC BLOOD PRESSURE: 65 MMHG | OXYGEN SATURATION: 95 % | TEMPERATURE: 98.6 F | HEIGHT: 72 IN | WEIGHT: 178 LBS | SYSTOLIC BLOOD PRESSURE: 100 MMHG | BODY MASS INDEX: 24.11 KG/M2

## 2023-06-06 PROCEDURE — 99213 OFFICE O/P EST LOW 20 MIN: CPT

## 2023-06-11 LAB — CEA SERPL-MCNC: 1.3 NG/ML

## 2023-06-11 NOTE — REVIEW OF SYSTEMS
[Constipation] : constipation [Joint Pain] : joint pain [Negative] : Allergic/Immunologic [Chest Pain] : no chest pain [FreeTextEntry9] : Hip pain

## 2023-06-11 NOTE — ASSESSMENT
[FreeTextEntry1] : 72-year-old gentleman who was found to have a stage IIA (T3, N0, M0) resected  colon cancer on 1/6/2022 with an intact MMR panel with a low probability of MSI-H and no evident high risk features.  Given this finding in conjunction with his age and comorbidities it is not likely that he will get substantial benefit from adjuvant chemotherapy.  While single agent capecitabine may be considered the overall benefit is likely to be well short of 5%, especially in the 70 and over age group.  \par \par \par Of note he has a hx of [place] 4+3 prostate cancer s/p robotic assisted laparoscopic radical prostatectomy with  \par Bone scan done on 9/2/2022 was negative.  \par \par CT CAP 2/1/23  no evidence of metastatic disease or pathologic adenopathy. Evidence of chronic pancreatitis with chronic large intraductal calcification and associated ductal dilatation.\par \par Plan:\par - Continue to monitor CEA/PSA\par - Continue follow up with urology for the management of prostate cancer. Last seen 2/3/23. PSA has been undetectable. \par - Genetics? \par - Reviewed recent CT CAP 2/1/23.---annual  imaging. \par - follow up with GI for surveillance colonoscopies- \par - Continue to follow up with pain management for low back and hip pain. S/p multiple epidurals. Sees monthly. No evidence of new etiology on recent CT CAP or bone scan.\par - Labs ordered, drawn in the office, and reviewed \par -Continue routine, age-appropriate, healthcare maintenance \par - History of present illness, review of systems, physical exam and treatment plan reviewed with Dr. Chrissie Herrera\par -Office visit in  weeks or prn for new or worsening symptoms.

## 2023-06-11 NOTE — HISTORY OF PRESENT ILLNESS
[T: ___] : T[unfilled] [N: ___] : N[unfilled] [AJCC Stage: ____] : AJCC Stage: [unfilled] [0 - No Distress] : Distress Level: 0 [de-identified] : Mr. Hever Zimmerman is a 73-year-old male who is referred by Dr. Mercedes Hamilton for initial consultation for stage IIa adenocarcinoma of the colon.  \par Mr. Hernández was referred for colonoscopy in December 2021 to evaluate rectal bleeding. \par   Colonoscopy on December 3, 2021 revealed a small nodular mass in the sigmoid colon at 25 cm.  Pathology confirmed a invasive moderately differentiated adenocarcinoma.  CT of the abdomen pelvis on December 3, 2021 a eccentric mass within the proximal sigmoid colon measuring approximately 4 cm in length and probable  left lobe hepatic cysts.  CT of the chest on January 3, 2022 showed fibrocalcific changes at the right apex with stable fibrotic nodular density which were unchanged since prior CT chest from June 2017 \par \par He underwent sigmoidectomy on January 6, 2022 showed a Stage IIB  moderately differentiated adenocarcinoma measuring 2.5 cm with invasion through the muscularis propria into pericolorectal tissue (T3N0Mx) with mismatch repair proteins results showing intact nuclear expression.  Chemotherapy was  deferred. He has been under surveillance [de-identified] : Patient presents today for routine follow up for Stage II Colon Cancer on observation. 1/2022.  Prostate cancer followed by  and . \par He has pain in right hip followed by pain mgmt bone scan 2/23 neg except OA\par \par \par He is also status post robotic assisted laparoscopic radical prostatectomy with pelvic lymph node dissection pathology revealed a pT3aN0 [place] 4+3 prostate cancer. 9/2022\par Bone scan done on 9/2/2022 was negative. \par He continues to follow with urology Dr. Edwards. PSA has been undetectable. Last seen 2/3/23 \par He is s/p recent CT CAP 2/1/23 revealing no evidence of metastatic disease or pathologic adenopathy. Evidence of chronic pancreatitis with chronic large intraductal calcification and associated ductal dilatation.\par

## 2023-06-16 ENCOUNTER — APPOINTMENT (OUTPATIENT)
Dept: UROLOGY | Facility: CLINIC | Age: 73
End: 2023-06-16
Payer: MEDICARE

## 2023-06-16 VITALS
WEIGHT: 178 LBS | BODY MASS INDEX: 24.11 KG/M2 | HEIGHT: 72 IN | SYSTOLIC BLOOD PRESSURE: 117 MMHG | DIASTOLIC BLOOD PRESSURE: 72 MMHG | HEART RATE: 66 BPM

## 2023-06-16 DIAGNOSIS — N52.31 ERECTILE DYSFUNCTION FOLLOWING RADICAL PROSTATECTOMY: ICD-10-CM

## 2023-06-16 PROCEDURE — 99214 OFFICE O/P EST MOD 30 MIN: CPT

## 2023-06-16 NOTE — ASSESSMENT
[FreeTextEntry1] : 74yo male with PSA 2.74, [place] 4+3=7, GG3 prostate cancer\par s/p RALP, PLND 9/22/22\par Final path pT3aN0, GG3, negative margins\par 2/2023 PSA was undetectable\par PSA today\par Recovering well\par Continue Kegels\par Trial of Cialis 20mg prn for ED\par F/u 4 months

## 2023-06-16 NOTE — HISTORY OF PRESENT ILLNESS
[Erectile Dysfunction] : Erectile Dysfunction [None] : None [FreeTextEntry1] : This is a 71yo male with history of sigmoid cancer s/p robotic sigmoidectomy 1/2022, recently diagnosed [place] 4+3=7, GG3, prostate cancer, here for evaluation. Last PSA 11/2021 = 2.74. No MRI or other imaging. \par \par 11/4/22 postop visit s/p RALP, PLND 9/22\par Path: pT3aN0, [place] 4+3=7, GG3, negative margins\par Urinary control is improving\par No erections yet \par \par 2/3/23 here for f/u.\par Last PSA undetectable\par Good urinary control \par No erections yet, interested in trying PDE5i \par \par 6/16/23 here for f/u\par Last PSA undetectable\par Good urinary control \par No erections with Viagra

## 2023-06-19 ENCOUNTER — NON-APPOINTMENT (OUTPATIENT)
Age: 73
End: 2023-06-19

## 2023-06-19 LAB — PSA SERPL-MCNC: <0.01 NG/ML

## 2023-06-21 ENCOUNTER — TRANSCRIPTION ENCOUNTER (OUTPATIENT)
Age: 73
End: 2023-06-21

## 2023-09-12 ENCOUNTER — RESULT REVIEW (OUTPATIENT)
Age: 73
End: 2023-09-12

## 2023-09-12 ENCOUNTER — APPOINTMENT (OUTPATIENT)
Dept: HEMATOLOGY ONCOLOGY | Facility: CLINIC | Age: 73
End: 2023-09-12
Payer: MEDICARE

## 2023-09-12 VITALS
HEART RATE: 59 BPM | WEIGHT: 178 LBS | OXYGEN SATURATION: 93 % | HEIGHT: 72 IN | TEMPERATURE: 97.7 F | DIASTOLIC BLOOD PRESSURE: 61 MMHG | SYSTOLIC BLOOD PRESSURE: 97 MMHG | BODY MASS INDEX: 24.11 KG/M2 | RESPIRATION RATE: 18 BRPM

## 2023-09-12 PROCEDURE — 36415 COLL VENOUS BLD VENIPUNCTURE: CPT

## 2023-09-12 PROCEDURE — 99213 OFFICE O/P EST LOW 20 MIN: CPT | Mod: 25

## 2023-09-16 LAB
CEA SERPL-MCNC: 0.9 NG/ML
PSA SERPL-MCNC: <0.01 NG/ML

## 2023-12-15 ENCOUNTER — APPOINTMENT (OUTPATIENT)
Dept: UROLOGY | Facility: CLINIC | Age: 73
End: 2023-12-15

## 2023-12-15 ENCOUNTER — APPOINTMENT (OUTPATIENT)
Dept: PAIN MANAGEMENT | Facility: CLINIC | Age: 73
End: 2023-12-15
Payer: MEDICARE

## 2023-12-15 VITALS
SYSTOLIC BLOOD PRESSURE: 124 MMHG | BODY MASS INDEX: 24.11 KG/M2 | HEIGHT: 72 IN | WEIGHT: 178 LBS | DIASTOLIC BLOOD PRESSURE: 62 MMHG

## 2023-12-15 PROCEDURE — 99214 OFFICE O/P EST MOD 30 MIN: CPT

## 2023-12-15 NOTE — ASSESSMENT
[FreeTextEntry1] : >> Imaging and Other Studies   I personally reviewed the relevant imaging.  Discussed and explained to patient the likely source of pathology and pain.  Questions answered. XR  back and leg pain likely secondary to lumbar radiculopathy and discogenic pain refractory to conservative treatments including 6 consecutive weeks of home exercises/PT, will obtain MRI LS w wo IVC to evaluate for pathology  may consider PT vs intervention pending eval  >> Therapy and Other Modalities   PT  >> Medications  Regarding opiate medication to manage pain. I had a detailed discussion with the patient regarding the risks of long-term opioid use, including the potential for medication side effects, hyperaglesia, endocrine dysfunction,  Encouraged weaning with assistance of current prescriber.  narcan available  trial gabapentin uptitrate to TID cautioned change in mood.  Encouraged to call with any worsening mood or depression/suicidal ideations  tizanidine prn spasm/pain  I advised JEROMY that the NSAID should be taken with food.  In addition while taking the prescribed NSAID, no over the counter or other NSAIDs should be used, such as ibuprofen (Motrin or Advil) or naproxen (Aleve) as this can cause stomach upset or other side effects.  If needed for fever or breakthrough pain Tylenol can be used.  >> Interventions   na  >> Consults   >> Discussion of Risks/Benefits/Alternatives    >Regarding any scheduled procedures:  In the event the patient is scheduled for a procedure,   I have discussed in detail with the patient that any interventional pain procedure is associated with potential risks.  The procedure may include an injection of steroids and potentially other medications (local anesthetic and normal saline) into the epidural space or surrounding tissue of the spine.  There are significant risks of this procedure which include and are not limited to infection, bleeding, worsening pain, dural puncture leading to postdural puncture headache, nerve damage, spinal cord injury, paralysis, stroke, and death.    There is a chance that the procedure does not improve their pain.    There are risks associated with the steroid being absorbed into the body systemically.  These include dysphoria, difficulty sleeping, mood swings and personality changes.  Premenopausal women may notice an irregularity in her menstrual cycle for 2-3 months following the injection.  Steroids can specifically affect patients with hypertension, diabetes, and peptic ulcers.  The procedure may cause a temporary increase in blood pressure and blood pressure, and may adversely affect a peptic ulcer.  Other, more rare complications, include avascular necrosis of joints, glaucoma and worsening of osteoporosis.   I have discussed the risks of the procedure at length with the patient, and the potential benefits of pain relief.  I have offered alternatives to the procedure.  All questions were answered.    The patient expressed understanding and wishes to proceed with the procedure.   >> Conclusion   The above diagnosis and treatment plan is medically reasonable and necessary based on the patient encounter There were no barriers to communication. Informed patient that I would be available for any additional questions. Patient was instructed to call with any worsening symptoms including severe pain, new numbness/weakness, or changes in the bowel/bladder function. Discussed role of nsaids in pain management and all relevant risks, if patient is continuing to require after 4 weeks the patient should f/u for alternative treatment. Instructed patient to maintain pain diary to monitor pain level, mobility, and function.

## 2023-12-15 NOTE — HISTORY OF PRESENT ILLNESS
[Back Pain] : back pain [___ yrs] : [unfilled] year(s) ago [Constant] : constant [7] : an average pain level of 7/10 [6] : a minimum pain level of 6/10 [10] : a maximum pain level of 10/10 [Throbbing] : throbbing [Shooting] : shooting [Walking] : walking [Bending] : bending [Medications] : medications [FreeTextEntry1] : HPI     Mr. JEROMY JAMES is a 73 year M on baby ASA with pmhx of prostate CA (s/p prostatectomy 9/22), colon CA (s/p resection 1/22), hx chronic pancreatitis, HTN, CAD with stent x 2 (1998, 2008). C/o axial lower back pain that radiates to left thigh and right buttock. Maintained on opiates and NSAIDs per Dr Davis for 20 yrs. Reports the [facility] practice is closing and is seeking a new pain physician. Denies any additional weakness, numbness, bowel/bladder dysfunction, history of bleeding disorders.    Previous and current pain medications/doses/effects: Fentanyl patch 50mg, Hydrocodone 5/325 prn, meloxicam    Previous Pain Treatments:      Previous Pain Injections:     Previous Diagnostic Studies/Images:  05/13/2021 Exam: MRI of the Lumbar Spine  CLINICAL INFORMATION: Severe low back pain.  TECHNIQUE: Sagittal T1 and T2 as well as axial T2 weighted images of the lumbar spine are obtained without the use of intravenous contrast.  COMPARISON: Prior examination 06/16/2020.  FINDINGS: There is again now an old superior endplate fracture of T12. There is slight spondylolisthesis at C2-C3. There is again severe desiccation loss of heignt of all the intervertebral discs in the lumbar spine sparing L1-L2. There is again moderate-to-severe osteophyte disc complex at L2-L3 with mild-to-moderate ligamentous and moderate facet hypertrophy yielding moderate-to-severe right neural foraminal stenosis and mild-to-moderate canal stenosis. No significant left-sided neural foraminal stenosis is noted.  Again seen is a moderate osteophyte disc complex at L3-L4 with mild-to- moderate ligamentous and severe facet hypertrophy yielding severe spinal canal and severe bilateral neural foraminal stenosis.  There is again osteophyte disc complex at L4-L5 with mild ligamentous and moderate-to-severe facet hypertrophy yielding severe bilatera neural foraminal stenosis  - and moderate canal stenosis. There is facet edema which could be a further source of pain.  Again seen is moderate osteophyte disc complex at L5-S1. There is moderate-to-severe facet hypertrophy yielding severe bilateral neural foraminal stenosis but no significant canal stenosis. There is severe bilateral lateral recess stenos 3.  IMPRESSION: Multilevel degenerative disc disease.   ====================================================================================== 6/16/20  Exam: MRI of the Thoracic Spine  HISTORY: Severe back pain after lifting a grill.  FINDINGS: Examination is a MRI of the thoracic spine without contrast enhancement.  There is a subacute compression to the superior endplate of T12. There is an older mild compression to T6. The T12 compression does not demonstrate any involvement of the posterior elements, and there is approximately 10% loss of height to the T12 vertebral body. There are no signs of any epidural hematomas or any signs of any intramedullary lesions or cord compressions. No evidence for any disc herniations or any other significant findings otherwise identified.  IMPRESSION:  Subacute compression to T12 with loss of height of about 10% to the superior endplate.   ====================================================================================== 8/31/20 Bone Density  IMPRESSION: Normal bone density lumbar spine.  Risk for lumbar spine fracture is not increased. Osteopenia left hip. Risk for hip fracture is mildly increased.     [FreeTextEntry7] : Lower back pain

## 2024-01-04 ENCOUNTER — RESULT REVIEW (OUTPATIENT)
Age: 74
End: 2024-01-04

## 2024-01-12 ENCOUNTER — APPOINTMENT (OUTPATIENT)
Dept: PAIN MANAGEMENT | Facility: CLINIC | Age: 74
End: 2024-01-12
Payer: MEDICARE

## 2024-01-12 VITALS
HEIGHT: 72 IN | WEIGHT: 178 LBS | BODY MASS INDEX: 24.11 KG/M2 | DIASTOLIC BLOOD PRESSURE: 58 MMHG | SYSTOLIC BLOOD PRESSURE: 102 MMHG

## 2024-01-12 PROCEDURE — 99214 OFFICE O/P EST MOD 30 MIN: CPT

## 2024-01-12 NOTE — ASSESSMENT
[FreeTextEntry1] : >> Imaging and Other Studies   I personally reviewed the relevant imaging.  Discussed and explained to patient the likely source of pathology and pain.  Questions answered. XR MRI   >> Therapy and Other Modalities   PT  >> Medications  Regarding opiate medication to manage pain. I had a detailed discussion with the patient regarding the risks of long-term opioid use, including the potential for medication side effects, hyperaglesia, endocrine dysfunction,  Encouraged weaning with assistance of current prescriber.  narcan available  gabapentin TID cautioned change in mood.  Encouraged to call with any worsening mood or depression/suicidal ideations  trial flexeril prn spasm/pain  I advised JEROMY that the NSAID should be taken with food.  In addition while taking the prescribed NSAID, no over the counter or other NSAIDs should be used, such as ibuprofen (Motrin or Advil) or naproxen (Aleve) as this can cause stomach upset or other side effects.  If needed for fever or breakthrough pain Tylenol can be used.  >> Interventions   Significant component of back and leg pain likely secondary to lumbar spinal stenosis demonstrated on MRI LS.  Will schedule L4-5 interlaminar epidural steroid injection r/b/a discussed   >> Consults   >> Discussion of Risks/Benefits/Alternatives    >Regarding any scheduled procedures:  In the event the patient is scheduled for a procedure,   I have discussed in detail with the patient that any interventional pain procedure is associated with potential risks.  The procedure may include an injection of steroids and potentially other medications (local anesthetic and normal saline) into the epidural space or surrounding tissue of the spine.  There are significant risks of this procedure which include and are not limited to infection, bleeding, worsening pain, dural puncture leading to postdural puncture headache, nerve damage, spinal cord injury, paralysis, stroke, and death.    There is a chance that the procedure does not improve their pain.    There are risks associated with the steroid being absorbed into the body systemically.  These include dysphoria, difficulty sleeping, mood swings and personality changes.  Premenopausal women may notice an irregularity in her menstrual cycle for 2-3 months following the injection.  Steroids can specifically affect patients with hypertension, diabetes, and peptic ulcers.  The procedure may cause a temporary increase in blood pressure and blood pressure, and may adversely affect a peptic ulcer.  Other, more rare complications, include avascular necrosis of joints, glaucoma and worsening of osteoporosis.   I have discussed the risks of the procedure at length with the patient, and the potential benefits of pain relief.  I have offered alternatives to the procedure.  All questions were answered.    The patient expressed understanding and wishes to proceed with the procedure.   >> Conclusion   The above diagnosis and treatment plan is medically reasonable and necessary based on the patient encounter There were no barriers to communication. Informed patient that I would be available for any additional questions. Patient was instructed to call with any worsening symptoms including severe pain, new numbness/weakness, or changes in the bowel/bladder function. Discussed role of nsaids in pain management and all relevant risks, if patient is continuing to require after 4 weeks the patient should f/u for alternative treatment. Instructed patient to maintain pain diary to monitor pain level, mobility, and function.

## 2024-01-12 NOTE — HISTORY OF PRESENT ILLNESS
[Back Pain] : back pain [___ yrs] : [unfilled] year(s) ago [Constant] : constant [7] : an average pain level of 7/10 [6] : a minimum pain level of 6/10 [10] : a maximum pain level of 10/10 [Throbbing] : throbbing [Shooting] : shooting [Walking] : walking [Bending] : bending [Medications] : medications [FreeTextEntry1] : Interval Note:  Patient reports he is weaned to fentanyl 25mcg. Reports back and buttock pain.  Pain is so bad that patient finds it difficult to perform adls and ambulate. Continues on gabapentin TID, reports flexeril is effective.  Since last visit the pain is improved. Denies any additional weakness, numbness, bowel/bladder dysfunction.  Pain intensity is   HPI     Mr. JEROMY JAMES is a 73 year M on baby ASA with pmhx of prostate CA (s/p prostatectomy 9/22), colon CA (s/p resection 1/22), hx chronic pancreatitis, HTN, CAD with stent x 2 (1998, 2008). C/o axial lower back pain that radiates to left thigh and right buttock. Maintained on opiates and NSAIDs per Dr Davis for 20 yrs. Reports the [facility] practice is closing and is seeking a new pain physician. Denies any additional weakness, numbness, bowel/bladder dysfunction, history of bleeding disorders.    Previous and current pain medications/doses/effects: Fentanyl patch 50mg, Hydrocodone 5/325 prn, meloxicam    Previous Pain Treatments:      Previous Pain Injections:     Previous Diagnostic Studies/Images:  MRI LS 1/24  The patient is status post prior right L5 princess-laminectomy.   There is dextroscoliosis of the lumbar spine. There is mild anterolisthesis of L2 on L3. There is trace anterolisthesis of L4 on L5. There is mild right lateral subluxation of L3 on L4 and L4 on L5.  There is a moderate chronic compression fracture of the T12 vertebral body. There is a mild to moderate chronic compression fracture of the L4 vertebral body. There is mild retropulsion of the posterior aspect of the superior endplate of T12 that is flattening the ventral thecal sac, chronic. There are small Schmorl's nodes seen at the superior and inferior endplates of L4 vertebral body. The remaining vertebral bodies are normal height. There is severe loss of disc height at the L5/S1 disc space with anterior posterior osteophyte formation and Modic 2 and 3 adjacent endplate changes consistent with desiccation and degenerative disease. There is moderate loss of disc height and T2 signal at the L2/L3, L3/L4 and L4/L5 disc spaces with mild adjacent Modic 2 endplate changes and small anterior minimal posterior osteophyte formation consistent with desiccation and degenerative disease. There is vacuum phenomenon seen within the L2/L3, L3/L4, L4/L5, L5/S1 disc spaces consistent with degenerative changes. The L1/L2 disc spaces demonstrates desiccation. The conus terminates at the L1 level and demonstrates no evidence of abnormal signal changes.   Evaluation of the individual levels demonstrates at the L5/S1 level there is a diffuse disc bulge flattening the ventral thecal sac compressing the right contacting the left L5 foraminal exiting nerve roots. There is severe right and moderate to severe left foraminal narrowing. There is moderate facet degenerative changes, right greater than left. There has been prior decompression.   At the L4/L5 level there is minimal unroofing of the posterior aspect the disc space due to the anterolisthesis. There is a diffuse disc bulge and osteophyte flattening the ventral thecal sac and in close proximity to the distal foraminal/far lateral L4 exiting nerve root. The right L4 foraminal exiting nerve root is within normal limits. There is mild to moderate right and moderate left foraminal narrowing. There is moderate to severe bilateral facet and ligamentous hypertrophy. There is a tiny amount of fluid seen within the facet joints. The constellation of findings is causing mild spinal canal stenosis.   At the L3/L4 level there is a diffuse disc bulge with an asymmetric left foraminal and far lateral broad-based disc protrusion flattening the ventral thecal sac and compressing the distal foraminal/far lateral left L3 exiting nerve root as well as contacting the left descending L4 nerve root. There is moderate right and severe left foraminal narrowing. There is moderate to severe facet and ligamentous hypertrophy. There is narrowing of the left lateral recess. The constellation of findings is causing moderate spinal canal stenosis.   At the L2/L3 level there is unroofing of the posterior aspect the disc space due to the mild anterolisthesis. There is a superimposed diffuse disc bulge and osteophyte flattening the ventral thecal sac. There is mild bilateral foraminal narrowing. There is severe facet and ligamentous hypertrophy. The constellation of findings is causing mild spinal canal stenosis.   At the L1/L2 level there is a mild diffuse disc bulge flattening the ventral thecal sac. There is moderate facet and ligamentous hypertrophy. There is mild bilateral foraminal narrowing. There is no evidence of spinal canal stenosis.   There is a small 1 cm cyst seen within the right kidney.   Impression:   Status post right L5 hemilaminectomy.   Dextroscoliosis of the lumbar spine.   Mild anterolisthesis of L2 on L3.   Trace anterolisthesis of L4 on L5.   Mild right lateral subluxation of L3 on L4 and L4 on L5.    L5/S1  diffuse disc bulge  compressing the right contacting the left L5 foraminal exiting nerve roots.  There has been prior decompression.   L4/L5 diffuse disc bulge and osteophyte flattening the ventral thecal sac and in close proximity to the distal foraminal/far lateral L4 exiting nerve root. L4/L5 mild spinal canal stenosis.   L3/L4  diffuse disc bulge with an asymmetric left foraminal and far lateral broad-based disc protrusion  compressing the distal foraminal/far lateral left L3 exiting nerve root as well as contacting the left descending L4 nerve root. L3/L4 moderate spinal canal stenosis.   L2/L3  diffuse disc bulge and osteophyte. L2/L3 mild spinal canal stenosis.   L1/L2  mild diffuse disc bulge flattening the ventral thecal sac. There is moderate facet and ligamentous hypertrophy. There is mild bilateral foraminal narrowing. No evidence of spinal canal stenosis.   Moderate chronic compression fracture of the T12 vertebral body. Mild to moderate chronic compression fracture of the L4 vertebral body   Multilevel degenerative changes of the lumbar spine. 05/13/2021 Exam: MRI of the Lumbar Spine  CLINICAL INFORMATION: Severe low back pain.  TECHNIQUE: Sagittal T1 and T2 as well as axial T2 weighted images of the lumbar spine are obtained without the use of intravenous contrast.  COMPARISON: Prior examination 06/16/2020.  FINDINGS: There is again now an old superior endplate fracture of T12. There is slight spondylolisthesis at C2-C3. There is again severe desiccation loss of heignt of all the intervertebral discs in the lumbar spine sparing L1-L2. There is again moderate-to-severe osteophyte disc complex at L2-L3 with mild-to-moderate ligamentous and moderate facet hypertrophy yielding moderate-to-severe right neural foraminal stenosis and mild-to-moderate canal stenosis. No significant left-sided neural foraminal stenosis is noted.  Again seen is a moderate osteophyte disc complex at L3-L4 with mild-to- moderate ligamentous and severe facet hypertrophy yielding severe spinal canal and severe bilateral neural foraminal stenosis.  There is again osteophyte disc complex at L4-L5 with mild ligamentous and moderate-to-severe facet hypertrophy yielding severe bilatera neural foraminal stenosis  - and moderate canal stenosis. There is facet edema which could be a further source of pain.  Again seen is moderate osteophyte disc complex at L5-S1. There is moderate-to-severe facet hypertrophy yielding severe bilateral neural foraminal stenosis but no significant canal stenosis. There is severe bilateral lateral recess stenos 3.  IMPRESSION: Multilevel degenerative disc disease.   ====================================================================================== 6/16/20  Exam: MRI of the Thoracic Spine  HISTORY: Severe back pain after lifting a grill.  FINDINGS: Examination is a MRI of the thoracic spine without contrast enhancement.  There is a subacute compression to the superior endplate of T12. There is an older mild compression to T6. The T12 compression does not demonstrate any involvement of the posterior elements, and there is approximately 10% loss of height to the T12 vertebral body. There are no signs of any epidural hematomas or any signs of any intramedullary lesions or cord compressions. No evidence for any disc herniations or any other significant findings otherwise identified.  IMPRESSION:  Subacute compression to T12 with loss of height of about 10% to the superior endplate.   ====================================================================================== 8/31/20 Bone Density  IMPRESSION: Normal bone density lumbar spine.  Risk for lumbar spine fracture is not increased. Osteopenia left hip. Risk for hip fracture is mildly increased.     [FreeTextEntry7] : Lower back pain

## 2024-01-18 ENCOUNTER — RESULT REVIEW (OUTPATIENT)
Age: 74
End: 2024-01-18

## 2024-01-18 ENCOUNTER — APPOINTMENT (OUTPATIENT)
Dept: HEMATOLOGY ONCOLOGY | Facility: CLINIC | Age: 74
End: 2024-01-18
Payer: MEDICARE

## 2024-01-18 VITALS
OXYGEN SATURATION: 98 % | DIASTOLIC BLOOD PRESSURE: 61 MMHG | BODY MASS INDEX: 24.52 KG/M2 | HEART RATE: 60 BPM | HEIGHT: 72 IN | SYSTOLIC BLOOD PRESSURE: 108 MMHG | RESPIRATION RATE: 18 BRPM | TEMPERATURE: 97.8 F | WEIGHT: 181 LBS

## 2024-01-18 DIAGNOSIS — Z85.46 PERSONAL HISTORY OF MALIGNANT NEOPLASM OF PROSTATE: ICD-10-CM

## 2024-01-18 PROCEDURE — 99214 OFFICE O/P EST MOD 30 MIN: CPT

## 2024-01-21 PROBLEM — Z85.46 HISTORY OF PROSTATE CANCER: Status: ACTIVE | Noted: 2023-02-03

## 2024-01-21 RX ORDER — FENTANYL 25 UG/H
25 PATCH, EXTENDED RELEASE TRANSDERMAL
Refills: 0 | Status: ACTIVE | COMMUNITY

## 2024-01-23 LAB
CEA SERPL-MCNC: 1.4 NG/ML
PSA SERPL-MCNC: <0.01 NG/ML

## 2024-01-23 NOTE — REVIEW OF SYSTEMS
[Constipation] : constipation [Joint Pain] : joint pain [Negative] : Allergic/Immunologic [Chest Pain] : no chest pain [FreeTextEntry9] : Lower back pain

## 2024-01-23 NOTE — HISTORY OF PRESENT ILLNESS
[T: ___] : T[unfilled] [N: ___] : N[unfilled] [AJCC Stage: ____] : AJCC Stage: [unfilled] [0 - No Distress] : Distress Level: 0 [de-identified] : Mr. Hever Zimmerman is a 73-year-old male who is referred by Dr. Mercedes Hamilton for initial consultation for stage IIa adenocarcinoma of the colon.   Mr. Hernández was referred for colonoscopy in December 2021 to evaluate rectal bleeding.    Colonoscopy on December 3, 2021 revealed a small nodular mass in the sigmoid colon at 25 cm.  Pathology confirmed a invasive moderately differentiated adenocarcinoma.  CT of the abdomen pelvis on December 3, 2021 a eccentric mass within the proximal sigmoid colon measuring approximately 4 cm in length and probable  left lobe hepatic cysts.  CT of the chest on January 3, 2022 showed fibrocalcific changes at the right apex with stable fibrotic nodular density which were unchanged since prior CT chest from June 2017   He underwent sigmoidectomy on January 6, 2022 showed a Stage IIB  moderately differentiated adenocarcinoma measuring 2.5 cm with invasion through the muscularis propria into pericolorectal tissue (T3N0Mx) with mismatch repair proteins results showing intact nuclear expression.  Chemotherapy was  deferred. He has been under surveillance [de-identified] : Patient presents today for routine follow up for Stage II Colon Cancer on observation. 1/2022 He is also status post robotic assisted laparoscopic radical prostatectomy with pelvic lymph node dissection pathology revealed a pT3aN0 Mirna 4+3 prostate cancer. 9/2022 Bone scan done on 9/2/2022 was negative.  He continues to follow with urology Dr. Edwards. PSA has been undetectable. Last seen 2/3/23  He is s/p recent CT CAP 2/1/23 revealing no evidence of metastatic disease or pathologic adenopathy. Evidence of chronic pancreatitis with chronic large intraductal calcification and associated ductal dilatation.   He is seeing   for lumbar radiculopathy since his previous pain management physician is closing.  He is scheduled to receive an intralaminar steroid injection and was started on gabapentin.while being weaned off of Fentanyl. He is seeing cardiology to check his kidney function before evaluation of his cardiac stents. He underwent colonoscopy with Dr. Johnson which was unremarkable. He reports having painful BM with constipation despite laxative use.

## 2024-01-23 NOTE — ASSESSMENT
[FreeTextEntry1] : 73-year-old gentleman who was found to have a stage IIA (T3, N0, M0) resected colon cancer on 1/6/22 with an intact MMR panel with a low probability of MSI-H and no evident high risk features. Given this finding in conjunction with his age and comorbidities it is not likely that he will get substantial benefit from adjuvant chemotherapy. While single agent capecitabine may be considered the overall benefit is likely to be well short of 5%, especially in the 70 and over age group. Chemotherapy deferred   Of note he has a hx of M[place]ord 4+3 prostate cancer s/p robotic assisted laparoscopic radical prostatectomy in ? 9/29/2022 Bone scan done on 9/2/2022 was negative.  CT CAP 2/1/23 no evidence of metastatic disease or pathologic adenopathy. Evidence of chronic pancreatitis with chronic large intraductal calcification and associated ductal dilatation.  Genetic testing negative  Plan: - H/O Colon Cancer and Prostate Cancer on observation - Continue to monitor CEA/PSA - Continue follow up with urology for the management of prostate cancer. PSA has been undetectable 6/23 -  CT CAP 2/1/23 neg for malignancy. Chronic pancreatitis-forwarded to GI. .Annual imaging. Will reorder annual CT Chest, CT Abd/pelvis for 2/24 - follow up with GI for surveillance colonoscopies ( colonoscopy 6/21/23) - discussed that if abd discomfort continues he should follow up with GI - Continue routine, age-appropriate, healthcare maintenance  -  History of present illness, review of systems, physical exam and treatment plan reviewed with  - Office visit in 6 weeks to review scans or prn for new or worsening symptoms.

## 2024-01-31 ENCOUNTER — APPOINTMENT (OUTPATIENT)
Dept: PAIN MANAGEMENT | Facility: CLINIC | Age: 74
End: 2024-01-31
Payer: MEDICARE

## 2024-01-31 VITALS
SYSTOLIC BLOOD PRESSURE: 111 MMHG | OXYGEN SATURATION: 98 % | HEIGHT: 72 IN | BODY MASS INDEX: 24.52 KG/M2 | WEIGHT: 181 LBS | RESPIRATION RATE: 18 BRPM | DIASTOLIC BLOOD PRESSURE: 97 MMHG | HEART RATE: 67 BPM

## 2024-01-31 PROCEDURE — 62323 NJX INTERLAMINAR LMBR/SAC: CPT

## 2024-01-31 RX ADMIN — TRIAMCINOLONE ACETONIDE 0 MG/ML: 80 INJECTION, SUSPENSION INTRA-ARTICULAR; INTRAMUSCULAR at 00:00

## 2024-01-31 RX ADMIN — IOHEXOL 0 MG/ML: 180 INJECTION INTRAVENOUS at 00:00

## 2024-01-31 RX ADMIN — LIDOCAINE HYDROCHLORIDE %: 10 INJECTION, SOLUTION INFILTRATION; PERINEURAL at 00:00

## 2024-02-04 ENCOUNTER — RESULT REVIEW (OUTPATIENT)
Age: 74
End: 2024-02-04

## 2024-02-07 ENCOUNTER — NON-APPOINTMENT (OUTPATIENT)
Age: 74
End: 2024-02-07

## 2024-02-14 ENCOUNTER — APPOINTMENT (OUTPATIENT)
Dept: PAIN MANAGEMENT | Facility: CLINIC | Age: 74
End: 2024-02-14
Payer: MEDICARE

## 2024-02-14 VITALS
BODY MASS INDEX: 24.52 KG/M2 | DIASTOLIC BLOOD PRESSURE: 77 MMHG | SYSTOLIC BLOOD PRESSURE: 129 MMHG | WEIGHT: 181 LBS | HEIGHT: 72 IN

## 2024-02-14 DIAGNOSIS — F11.90 OPIOID USE, UNSPECIFIED, UNCOMPLICATED: ICD-10-CM

## 2024-02-14 PROCEDURE — G2211 COMPLEX E/M VISIT ADD ON: CPT

## 2024-02-14 PROCEDURE — 99214 OFFICE O/P EST MOD 30 MIN: CPT

## 2024-02-14 NOTE — ASSESSMENT
[FreeTextEntry1] : >> Imaging and Other Studies   I personally reviewed the relevant imaging.  Discussed and explained to patient the likely source of pathology and pain.  Questions answered. XR MRI   >> Therapy and Other Modalities  start PT - referral pain  >> Medications  Regarding opiate medication to manage pain. I had a detailed discussion with the patient regarding the risks of long-term opioid use, including the potential for medication side effects, hyperaglesia, endocrine dysfunction,  Encouraged weaning with assistance of current prescriber.  narcan available  gabapentin TID cautioned change in mood.  Encouraged to call with any worsening mood or depression/suicidal ideations  trial tizanidine prn spasm/pain   I advised JEROMY that the NSAID should be taken with food.  In addition while taking the prescribed NSAID, no over the counter or other NSAIDs should be used, such as ibuprofen (Motrin or Advil) or naproxen (Aleve) as this can cause stomach upset or other side effects.  If needed for fever or breakthrough pain Tylenol can be used.   >> Interventions   Significant component of back and leg pain likely secondary to lumbar spinal stenosis demonstrated on MRI LS.  sp L4-5 interlaminar epidural steroid injection with significant improvement   >> Consults   >> Discussion of Risks/Benefits/Alternatives    >Regarding any scheduled procedures:  In the event the patient is scheduled for a procedure,   I have discussed in detail with the patient that any interventional pain procedure is associated with potential risks.  The procedure may include an injection of steroids and potentially other medications (local anesthetic and normal saline) into the epidural space or surrounding tissue of the spine.  There are significant risks of this procedure which include and are not limited to infection, bleeding, worsening pain, dural puncture leading to postdural puncture headache, nerve damage, spinal cord injury, paralysis, stroke, and death.    There is a chance that the procedure does not improve their pain.    There are risks associated with the steroid being absorbed into the body systemically.  These include dysphoria, difficulty sleeping, mood swings and personality changes.  Premenopausal women may notice an irregularity in her menstrual cycle for 2-3 months following the injection.  Steroids can specifically affect patients with hypertension, diabetes, and peptic ulcers.  The procedure may cause a temporary increase in blood pressure and blood pressure, and may adversely affect a peptic ulcer.  Other, more rare complications, include avascular necrosis of joints, glaucoma and worsening of osteoporosis.   I have discussed the risks of the procedure at length with the patient, and the potential benefits of pain relief.  I have offered alternatives to the procedure.  All questions were answered.    The patient expressed understanding and wishes to proceed with the procedure.    > Longitudinal management of Complex Painful condition   The patient is being managed for a complex condition that requires ongoing management.  The nature of this condition demands nuanced approach to treatment.  The seriousness of the condition necessitates an in-depth and focused approach to management and coordination with other healthcare professionals.    This visit involves intricate evaluation and management of the patient's condition.  The complexity of the visit was due to the need for detailed assessment of the current state, consideration of potential complications and a careful balancing of treatment options to management the chronic condition effectively.   As detailed above, the patient has a chronic significant painful condition that requires regular and detailed management.  The condition's impact on the patient's quality of life and health is substantial and necessitates a comprehensive and tailored approach   >> Conclusion   There were no barriers to communication. Informed patient that I would be available for any additional questions. Patient was instructed to call with any worsening symptoms including severe pain, new numbness/weakness, or changes in the bowel/bladder function. Discussed role of nsaids in pain management and all relevant risks, if patient is continuing to require after 4 weeks the patient should f/u for alternative treatment. Instructed patient to maintain pain diary to monitor pain level, mobility, and function.

## 2024-02-14 NOTE — HISTORY OF PRESENT ILLNESS
[Back Pain] : back pain [___ yrs] : [unfilled] year(s) ago [Constant] : constant [7] : an average pain level of 7/10 [6] : a minimum pain level of 6/10 [10] : a maximum pain level of 10/10 [Throbbing] : throbbing [Shooting] : shooting [Walking] : walking [Bending] : bending [Medications] : medications [FreeTextEntry1] : Interval Note:  sp L4-5 interlaminar epidural steroid injection 1/31/24 with significant improvement in back pain.  Patient reports that he continues gabapentin and tizanidine.  Of note he continues to utilize fentanyl 25mcg patches. Reports back and buttock pain.  Since last visit the pain is improved. Denies any additional weakness, numbness, bowel/bladder dysfunction.     HPI     Mr. JEROMY JAMES is a 73 year M on baby ASA with pmhx of prostate CA (s/p prostatectomy 9/22), colon CA (s/p resection 1/22), hx chronic pancreatitis, HTN, CAD with stent x 2 (1998, 2008). C/o axial lower back pain that radiates to left thigh and right buttock. Maintained on opiates and NSAIDs per Dr Davis for 20 yrs. Reports the [facility] is closing and is seeking a new pain physician. Denies any additional weakness, numbness, bowel/bladder dysfunction, history of bleeding disorders.    Previous and current pain medications/doses/effects: Fentanyl patch 50mg, Hydrocodone 5/325 prn, meloxicam    Previous Pain Treatments:      Previous Pain Injections:   L4-5 interlaminar epidural steroid injection 1/31/24  Previous Diagnostic Studies/Images:  MRI LS 1/24  The patient is status post prior right L5 princess-laminectomy.   There is dextroscoliosis of the lumbar spine. There is mild anterolisthesis of L2 on L3. There is trace anterolisthesis of L4 on L5. There is mild right lateral subluxation of L3 on L4 and L4 on L5.  There is a moderate chronic compression fracture of the T12 vertebral body. There is a mild to moderate chronic compression fracture of the L4 vertebral body. There is mild retropulsion of the posterior aspect of the superior endplate of T12 that is flattening the ventral thecal sac, chronic. There are small Schmorl's nodes seen at the superior and inferior endplates of L4 vertebral body. The remaining vertebral bodies are normal height. There is severe loss of disc height at the L5/S1 disc space with anterior posterior osteophyte formation and Modic 2 and 3 adjacent endplate changes consistent with desiccation and degenerative disease. There is moderate loss of disc height and T2 signal at the L2/L3, L3/L4 and L4/L5 disc spaces with mild adjacent Modic 2 endplate changes and small anterior minimal posterior osteophyte formation consistent with desiccation and degenerative disease. There is vacuum phenomenon seen within the L2/L3, L3/L4, L4/L5, L5/S1 disc spaces consistent with degenerative changes. The L1/L2 disc spaces demonstrates desiccation. The conus terminates at the L1 level and demonstrates no evidence of abnormal signal changes.   Evaluation of the individual levels demonstrates at the L5/S1 level there is a diffuse disc bulge flattening the ventral thecal sac compressing the right contacting the left L5 foraminal exiting nerve roots. There is severe right and moderate to severe left foraminal narrowing. There is moderate facet degenerative changes, right greater than left. There has been prior decompression.   At the L4/L5 level there is minimal unroofing of the posterior aspect the disc space due to the anterolisthesis. There is a diffuse disc bulge and osteophyte flattening the ventral thecal sac and in close proximity to the distal foraminal/far lateral L4 exiting nerve root. The right L4 foraminal exiting nerve root is within normal limits. There is mild to moderate right and moderate left foraminal narrowing. There is moderate to severe bilateral facet and ligamentous hypertrophy. There is a tiny amount of fluid seen within the facet joints. The constellation of findings is causing mild spinal canal stenosis.   At the L3/L4 level there is a diffuse disc bulge with an asymmetric left foraminal and far lateral broad-based disc protrusion flattening the ventral thecal sac and compressing the distal foraminal/far lateral left L3 exiting nerve root as well as contacting the left descending L4 nerve root. There is moderate right and severe left foraminal narrowing. There is moderate to severe facet and ligamentous hypertrophy. There is narrowing of the left lateral recess. The constellation of findings is causing moderate spinal canal stenosis.   At the L2/L3 level there is unroofing of the posterior aspect the disc space due to the mild anterolisthesis. There is a superimposed diffuse disc bulge and osteophyte flattening the ventral thecal sac. There is mild bilateral foraminal narrowing. There is severe facet and ligamentous hypertrophy. The constellation of findings is causing mild spinal canal stenosis.   At the L1/L2 level there is a mild diffuse disc bulge flattening the ventral thecal sac. There is moderate facet and ligamentous hypertrophy. There is mild bilateral foraminal narrowing. There is no evidence of spinal canal stenosis.   There is a small 1 cm cyst seen within the right kidney.   Impression:   Status post right L5 hemilaminectomy.   Dextroscoliosis of the lumbar spine.   Mild anterolisthesis of L2 on L3.   Trace anterolisthesis of L4 on L5.   Mild right lateral subluxation of L3 on L4 and L4 on L5.    L5/S1  diffuse disc bulge  compressing the right contacting the left L5 foraminal exiting nerve roots.  There has been prior decompression.   L4/L5 diffuse disc bulge and osteophyte flattening the ventral thecal sac and in close proximity to the distal foraminal/far lateral L4 exiting nerve root. L4/L5 mild spinal canal stenosis.   L3/L4  diffuse disc bulge with an asymmetric left foraminal and far lateral broad-based disc protrusion  compressing the distal foraminal/far lateral left L3 exiting nerve root as well as contacting the left descending L4 nerve root. L3/L4 moderate spinal canal stenosis.   L2/L3  diffuse disc bulge and osteophyte. L2/L3 mild spinal canal stenosis.   L1/L2  mild diffuse disc bulge flattening the ventral thecal sac. There is moderate facet and ligamentous hypertrophy. There is mild bilateral foraminal narrowing. No evidence of spinal canal stenosis.   Moderate chronic compression fracture of the T12 vertebral body. Mild to moderate chronic compression fracture of the L4 vertebral body   Multilevel degenerative changes of the lumbar spine. 05/13/2021 Exam: MRI of the Lumbar Spine  CLINICAL INFORMATION: Severe low back pain.  TECHNIQUE: Sagittal T1 and T2 as well as axial T2 weighted images of the lumbar spine are obtained without the use of intravenous contrast.  COMPARISON: Prior examination 06/16/2020.  FINDINGS: There is again now an old superior endplate fracture of T12. There is slight spondylolisthesis at C2-C3. There is again severe desiccation loss of heignt of all the intervertebral discs in the lumbar spine sparing L1-L2. There is again moderate-to-severe osteophyte disc complex at L2-L3 with mild-to-moderate ligamentous and moderate facet hypertrophy yielding moderate-to-severe right neural foraminal stenosis and mild-to-moderate canal stenosis. No significant left-sided neural foraminal stenosis is noted.  Again seen is a moderate osteophyte disc complex at L3-L4 with mild-to- moderate ligamentous and severe facet hypertrophy yielding severe spinal canal and severe bilateral neural foraminal stenosis.  There is again osteophyte disc complex at L4-L5 with mild ligamentous and moderate-to-severe facet hypertrophy yielding severe bilatera neural foraminal stenosis  - and moderate canal stenosis. There is facet edema which could be a further source of pain.  Again seen is moderate osteophyte disc complex at L5-S1. There is moderate-to-severe facet hypertrophy yielding severe bilateral neural foraminal stenosis but no significant canal stenosis. There is severe bilateral lateral recess stenos 3.  IMPRESSION: Multilevel degenerative disc disease.   ====================================================================================== 6/16/20  Exam: MRI of the Thoracic Spine  HISTORY: Severe back pain after lifting a grill.  FINDINGS: Examination is a MRI of the thoracic spine without contrast enhancement.  There is a subacute compression to the superior endplate of T12. There is an older mild compression to T6. The T12 compression does not demonstrate any involvement of the posterior elements, and there is approximately 10% loss of height to the T12 vertebral body. There are no signs of any epidural hematomas or any signs of any intramedullary lesions or cord compressions. No evidence for any disc herniations or any other significant findings otherwise identified.  IMPRESSION:  Subacute compression to T12 with loss of height of about 10% to the superior endplate.   ====================================================================================== 8/31/20 Bone Density  IMPRESSION: Normal bone density lumbar spine.  Risk for lumbar spine fracture is not increased. Osteopenia left hip. Risk for hip fracture is mildly increased.     [FreeTextEntry7] : Lower back pain

## 2024-02-26 ENCOUNTER — APPOINTMENT (OUTPATIENT)
Dept: HEMATOLOGY ONCOLOGY | Facility: CLINIC | Age: 74
End: 2024-02-26

## 2024-03-08 ENCOUNTER — RX RENEWAL (OUTPATIENT)
Age: 74
End: 2024-03-08

## 2024-03-25 ENCOUNTER — LABORATORY RESULT (OUTPATIENT)
Age: 74
End: 2024-03-25

## 2024-03-25 ENCOUNTER — APPOINTMENT (OUTPATIENT)
Dept: HEMATOLOGY ONCOLOGY | Facility: CLINIC | Age: 74
End: 2024-03-25
Payer: MEDICARE

## 2024-03-25 ENCOUNTER — RESULT REVIEW (OUTPATIENT)
Age: 74
End: 2024-03-25

## 2024-03-25 VITALS
WEIGHT: 166.38 LBS | DIASTOLIC BLOOD PRESSURE: 73 MMHG | BODY MASS INDEX: 22.54 KG/M2 | HEART RATE: 75 BPM | SYSTOLIC BLOOD PRESSURE: 117 MMHG | RESPIRATION RATE: 18 BRPM | TEMPERATURE: 97.3 F | HEIGHT: 72 IN | OXYGEN SATURATION: 97 %

## 2024-03-25 DIAGNOSIS — K86.1 OTHER CHRONIC PANCREATITIS: ICD-10-CM

## 2024-03-25 DIAGNOSIS — C61 MALIGNANT NEOPLASM OF PROSTATE: ICD-10-CM

## 2024-03-25 DIAGNOSIS — C18.7 MALIGNANT NEOPLASM OF SIGMOID COLON: ICD-10-CM

## 2024-03-25 PROCEDURE — ZZZZZ: CPT

## 2024-03-25 PROCEDURE — 99214 OFFICE O/P EST MOD 30 MIN: CPT

## 2024-03-25 PROCEDURE — G2211 COMPLEX E/M VISIT ADD ON: CPT

## 2024-03-25 RX ORDER — ALCLOMETASONE DIPROPIONATE 0.5 MG/G
0.05 OINTMENT TOPICAL
Refills: 0 | Status: COMPLETED | COMMUNITY
End: 2024-03-25

## 2024-03-25 RX ORDER — SILDENAFIL 100 MG/1
100 TABLET, FILM COATED ORAL
Qty: 6 | Refills: 0 | Status: ACTIVE | COMMUNITY
Start: 2024-03-13

## 2024-03-25 RX ORDER — FUROSEMIDE 20 MG/1
20 TABLET ORAL
Qty: 90 | Refills: 0 | Status: ACTIVE | COMMUNITY
Start: 2024-03-14

## 2024-03-25 RX ORDER — ATENOLOL 50 MG/1
50 TABLET ORAL
Refills: 0 | Status: COMPLETED | COMMUNITY
End: 2024-03-25

## 2024-03-25 RX ORDER — HYDROCODONE/ACETAMINOPHEN 10MG-500MG
10-500 TABLET ORAL
Refills: 0 | Status: COMPLETED | COMMUNITY
End: 2024-03-25

## 2024-03-25 RX ORDER — PANTOPRAZOLE 40 MG/1
40 TABLET, DELAYED RELEASE ORAL
Qty: 30 | Refills: 0 | Status: ACTIVE | COMMUNITY
Start: 2024-02-25

## 2024-03-25 RX ORDER — SILDENAFIL 50 MG/1
50 TABLET ORAL
Qty: 6 | Refills: 11 | Status: COMPLETED | COMMUNITY
Start: 2023-02-03 | End: 2024-03-25

## 2024-03-25 RX ORDER — POTASSIUM CHLORIDE 750 MG/1
10 TABLET, FILM COATED, EXTENDED RELEASE ORAL
Qty: 45 | Refills: 0 | Status: ACTIVE | COMMUNITY
Start: 2024-03-14

## 2024-03-25 RX ORDER — MOMETASONE FUROATE 1 MG/G
0.1 CREAM TOPICAL
Qty: 30 | Refills: 0 | Status: ACTIVE | COMMUNITY
Start: 2024-01-22

## 2024-03-25 RX ORDER — TADALAFIL 20 MG/1
20 TABLET ORAL
Qty: 6 | Refills: 5 | Status: COMPLETED | COMMUNITY
Start: 2023-06-16 | End: 2024-03-25

## 2024-03-25 RX ORDER — CYCLOBENZAPRINE HYDROCHLORIDE 10 MG/1
10 TABLET, FILM COATED ORAL
Qty: 30 | Refills: 0 | Status: ACTIVE | COMMUNITY
Start: 2023-11-30

## 2024-03-25 RX ORDER — METOPROLOL SUCCINATE 50 MG/1
50 TABLET, EXTENDED RELEASE ORAL
Refills: 0 | Status: ACTIVE | COMMUNITY

## 2024-03-25 RX ORDER — CYCLOBENZAPRINE HYDROCHLORIDE 5 MG/1
5 TABLET, FILM COATED ORAL
Qty: 45 | Refills: 1 | Status: COMPLETED | COMMUNITY
Start: 2024-01-12 | End: 2024-03-25

## 2024-03-25 NOTE — PHYSICAL EXAM
[Restricted in physically strenuous activity but ambulatory and able to carry out work of a light or sedentary nature] : Status 1- Restricted in physically strenuous activity but ambulatory and able to carry out work of a light or sedentary nature, e.g., light house work, office work [Normal] : affect appropriate [de-identified] : midline scar. Well healing. some scabs [de-identified] : supra umbilical scar

## 2024-03-25 NOTE — HISTORY OF PRESENT ILLNESS
[T: ___] : T[unfilled] [N: ___] : N[unfilled] [AJCC Stage: ____] : AJCC Stage: [unfilled] [0 - No Distress] : Distress Level: 0 [ECOG Performance Status: 1 - Restricted in physically strenuous activity but ambulatory and able to carry out work of a light or sedentary nature] : Performance Status: 1 - Restricted in physically strenuous activity but ambulatory and able to carry out work of a light or sedentary nature, e.g., light house work, office work [de-identified] : Mr. Hever Zimmerman is a 74-year-old male who is referred by Dr. Mercedes Hamilton for initial consultation for stage IIa adenocarcinoma of the colon.   Mr. Hernández was referred for colonoscopy in December 2021 to evaluate rectal bleeding.    Colonoscopy on December 3, 2021 revealed a small nodular mass in the sigmoid colon at 25 cm.  Pathology confirmed a invasive moderately differentiated adenocarcinoma.  CT of the abdomen pelvis on December 3, 2021 a eccentric mass within the proximal sigmoid colon measuring approximately 4 cm in length and probable  left lobe hepatic cysts.  CT of the chest on January 3, 2022 showed fibrocalcific changes at the right apex with stable fibrotic nodular density which were unchanged since prior CT chest from June 2017   He underwent sigmoidectomy on January 6, 2022 showed a Stage IIB  moderately differentiated adenocarcinoma measuring 2.5 cm with invasion through the muscularis propria into pericolorectal tissue (T3N0Mx) with mismatch repair proteins results showing intact nuclear expression.  Chemotherapy was  deferred. He has been under surveillance  He is also status post robotic assisted laparoscopic radical prostatectomy with pelvic lymph node dissection pathology revealed a pT3aN0 [place] 4+3 prostate cancer. 9/2022 Bone scan done on 9/2/2022 was negative.  He continues to follow with urology Dr. Edwards. PSA has been undetectable. Due to be seen this month  CT CAP 2/1/23 revealing no evidence of metastatic disease or pathologic adenopathy. Evidence of chronic pancreatitis with chronic large intraductal calcification and associated ductal dilatation.  7/2023: colonoscopy- 2 small polyps removed. benign. otherwise normal.-  Dr. Johnson [de-identified] : Patient presents today for routine follow up for Stage II Colon Cancer on observation.  Had triple bypass 2/2024. Still recovering. Has some fecal incontinence s/p prostatectomy but otherwise denies any other comlaints. Was   for lumbar radiculopathy since his previous pain management physician is closing. Now followed by Pain management at [facility]. He was weaned off fentanyl and currently on gabapentin.  Repeat CT scans 2/2024 RICKEY

## 2024-03-25 NOTE — ASSESSMENT
[FreeTextEntry1] : 74-year-old gentleman who was found to have a stage IIA (T3, N0, M0) resected colon cancer on 1/6/22 with an intact MMR panel with a low probability of MSI-H and no evident high risk features. Given this finding in conjunction with his age and comorbidities it is not likely that he will get substantial benefit from adjuvant chemotherapy. While single agent capecitabine may be considered the overall benefit is likely to be well short of 5%, especially in the 70 and over age group. Chemotherapy deferred   Of note he has a hx of [place] 4+3 prostate cancer s/p robotic assisted laparoscopic radical prostatectomy in ? 9/29/2022 Bone scan done on 9/2/2022 was negative.  CT CAP 2/5/24 no evidence of metastatic disease or pathologic adenopathy. Evidence of chronic pancreatitis with chronic large intraductal calcification and associated ductal dilatation.  Genetic testing negative  Plan: - H/O Colon Cancer and Prostate Cancer on observation - Continue to monitor CEA/PSA - Continue follow up with urology for the management of prostate cancer. PSA has been undetectable - CT CAP 2/5/24 neg for malignancy. Chronic pancreatitis-forwarded to GI..Annual imaging. Will reorder annual CT Chest, CT Abd/pelvis for 2/25 - follow up with GI for surveillance colonoscopies (colonoscopy 6/21/23) - discussed that if abd discomfort continues he should follow up with GI - Continue routine, age-appropriate, healthcare maintenance - Office visit in 4 months with repeat scans in 2/2025.

## 2024-03-25 NOTE — PHYSICAL EXAM
[Restricted in physically strenuous activity but ambulatory and able to carry out work of a light or sedentary nature] : Status 1- Restricted in physically strenuous activity but ambulatory and able to carry out work of a light or sedentary nature, e.g., light house work, office work [Normal] : grossly intact [de-identified] : midline scar. Well healing. some scabs [de-identified] : supra umbilical scar

## 2024-03-25 NOTE — HISTORY OF PRESENT ILLNESS
[T: ___] : T[unfilled] [N: ___] : N[unfilled] [M: ___] : M[unfilled] [AJCC Stage: ____] : AJCC Stage: [unfilled] [de-identified] : Mr. Hever Zimmerman is a 74-year-old male who is referred by Dr. Mercedes Hamilton for initial consultation for stage IIa adenocarcinoma of the colon. Mr. Hernández was referred for colonoscopy in December 2021 to evaluate rectal bleeding.  Colonoscopy on December 3, 2021 revealed a small nodular mass in the sigmoid colon at 25 cm. Pathology confirmed a invasive moderately differentiated adenocarcinoma. CT of the abdomen pelvis on December 3, 2021 a eccentric mass within the proximal sigmoid colon measuring approximately 4 cm in length and probable left lobe hepatic cysts. CT of the chest on January 3, 2022 showed fibrocalcific changes at the right apex with stable fibrotic nodular density which were unchanged since prior CT chest from June 2017  He underwent sigmoidectomy on January 6, 2022 showed a Stage IIB moderately differentiated adenocarcinoma measuring 2.5 cm with invasion through the muscularis propria into pericolorectal tissue (T3N0Mx) with mismatch repair proteins results showing intact nuclear expression. Chemotherapy was deferred. He has been under surveillance  He is also status post robotic assisted laparoscopic radical prostatectomy with pelvic lymph node dissection pathology revealed a pT3aN0 [place] 4+3 prostate cancer. 9/2022 Bone scan done on 9/2/2022 was negative. He continues to follow with urology Dr. Edwards. PSA has been undetectable. Due to be seen this month  CT CAP 2/1/23 revealing no evidence of metastatic disease or pathologic adenopathy. Evidence of chronic pancreatitis with chronic large intraductal calcification and associated ductal dilatation.  7/2023: colonoscopy- 2 small polyps removed. benign. otherwise, normal. - Dr. Johnson.       [de-identified] : Patient presents today for routine follow up for Stage II Colon Cancer on observation. Had triple bypass 2/2024. Still recovering. Has some fecal incontinence s/p prostatectomy but otherwise denies any other comlaints. Was  for lumbar radiculopathy since his previous pain management physician is closing. Now followed by Pain management at [facility]. He was weaned off fentanyl and currently on gabapentin.  Repeat CT scans 2/2024 RICKEY.

## 2024-03-25 NOTE — ASSESSMENT
[FreeTextEntry1] : 74-year-old gentleman who was found to have a stage IIA (T3, N0, M0) resected colon cancer on 1/6/22 with an intact MMR panel with a low probability of MSI-H and no evident high risk features. Given this finding in conjunction with his age and comorbidities it is not likely that he will get substantial benefit from adjuvant chemotherapy. While single agent capecitabine may be considered the overall benefit is likely to be well short of 5%, especially in the 70 and over age group. Chemotherapy deferred   Of note he has a hx of [place] 4+3 prostate cancer s/p robotic assisted laparoscopic radical prostatectomy in ? 9/29/2022 Bone scan done on 9/2/2022 was negative.  CT CAP 2/5/24 no evidence of metastatic disease or pathologic adenopathy. Evidence of chronic pancreatitis with chronic large intraductal calcification and associated ductal dilatation.  Genetic testing negative  Plan: - H/O Colon Cancer and Prostate Cancer on observation - Continue to monitor CEA/PSA - Continue follow up with urology for the management of prostate cancer. PSA has been undetectable  -  CT CAP 2/5/24 neg for malignancy. Chronic pancreatitis-forwarded to GI. .Annual imaging. Will reorder annual CT Chest, CT Abd/pelvis for 2/25 - follow up with GI for surveillance colonoscopies (colonoscopy 6/21/23) - discussed that if abd discomfort continues he should follow up with GI - Continue routine, age-appropriate, healthcare maintenance  - Office visit in 4 months with repeat scans in 2/2025

## 2024-04-09 RX ORDER — TIZANIDINE 2 MG/1
2 TABLET ORAL
Qty: 90 | Refills: 0 | Status: ACTIVE | COMMUNITY
Start: 2023-12-15 | End: 1900-01-01

## 2024-04-16 ENCOUNTER — RX CHANGE (OUTPATIENT)
Age: 74
End: 2024-04-16

## 2024-04-16 ENCOUNTER — APPOINTMENT (OUTPATIENT)
Dept: PAIN MANAGEMENT | Facility: CLINIC | Age: 74
End: 2024-04-16
Payer: MEDICARE

## 2024-04-16 VITALS
BODY MASS INDEX: 22.48 KG/M2 | SYSTOLIC BLOOD PRESSURE: 130 MMHG | HEIGHT: 72 IN | WEIGHT: 166 LBS | DIASTOLIC BLOOD PRESSURE: 70 MMHG

## 2024-04-16 PROCEDURE — 99214 OFFICE O/P EST MOD 30 MIN: CPT

## 2024-04-16 PROCEDURE — G2211 COMPLEX E/M VISIT ADD ON: CPT

## 2024-04-16 NOTE — HISTORY OF PRESENT ILLNESS
[Back Pain] : back pain [___ yrs] : [unfilled] year(s) ago [Constant] : constant [7] : an average pain level of 7/10 [6] : a minimum pain level of 6/10 [10] : a maximum pain level of 10/10 [Throbbing] : throbbing [Shooting] : shooting [Walking] : walking [Bending] : bending [Medications] : medications [FreeTextEntry1] : Interval Note:  sp L4-5 interlaminar epidural steroid injection 1/31/24 with significant improvement in back pain.  Of note patient had recent CABG and now on plavix.  Reports that he has weaned completely off of fentanyl patch and is have some difficulty sleeping at night.  Continues to utilize flexeril, reports 10 min is more effective.  Since last visit the pain is improved. Denies any additional weakness, numbness, bowel/bladder dysfunction.     HPI     Mr. JEROMY JAMES is a 73 year M sp CABG on plavix and baby ASA with pmhx of prostate CA (s/p prostatectomy 9/22), colon CA (s/p resection 1/22), hx chronic pancreatitis, HTN, CAD with stent x 2 (1998, 2008). C/o axial lower back pain that radiates to left thigh and right buttock. Maintained on opiates and NSAIDs per Dr Davis for 20 yrs. Reports the [place] practice is closing and is seeking a new pain physician. Denies any additional weakness, numbness, bowel/bladder dysfunction, history of bleeding disorders.    Previous and current pain medications/doses/effects: Fentanyl patch 50mg, Hydrocodone 5/325 prn, meloxicam    Previous Pain Treatments:      Previous Pain Injections:   L4-5 interlaminar epidural steroid injection 1/31/24  Previous Diagnostic Studies/Images:  MRI LS 1/24  The patient is status post prior right L5 princess-laminectomy.   There is dextroscoliosis of the lumbar spine. There is mild anterolisthesis of L2 on L3. There is trace anterolisthesis of L4 on L5. There is mild right lateral subluxation of L3 on L4 and L4 on L5.  There is a moderate chronic compression fracture of the T12 vertebral body. There is a mild to moderate chronic compression fracture of the L4 vertebral body. There is mild retropulsion of the posterior aspect of the superior endplate of T12 that is flattening the ventral thecal sac, chronic. There are small Schmorl's nodes seen at the superior and inferior endplates of L4 vertebral body. The remaining vertebral bodies are normal height. There is severe loss of disc height at the L5/S1 disc space with anterior posterior osteophyte formation and Modic 2 and 3 adjacent endplate changes consistent with desiccation and degenerative disease. There is moderate loss of disc height and T2 signal at the L2/L3, L3/L4 and L4/L5 disc spaces with mild adjacent Modic 2 endplate changes and small anterior minimal posterior osteophyte formation consistent with desiccation and degenerative disease. There is vacuum phenomenon seen within the L2/L3, L3/L4, L4/L5, L5/S1 disc spaces consistent with degenerative changes. The L1/L2 disc spaces demonstrates desiccation. The conus terminates at the L1 level and demonstrates no evidence of abnormal signal changes.   Evaluation of the individual levels demonstrates at the L5/S1 level there is a diffuse disc bulge flattening the ventral thecal sac compressing the right contacting the left L5 foraminal exiting nerve roots. There is severe right and moderate to severe left foraminal narrowing. There is moderate facet degenerative changes, right greater than left. There has been prior decompression.   At the L4/L5 level there is minimal unroofing of the posterior aspect the disc space due to the anterolisthesis. There is a diffuse disc bulge and osteophyte flattening the ventral thecal sac and in close proximity to the distal foraminal/far lateral L4 exiting nerve root. The right L4 foraminal exiting nerve root is within normal limits. There is mild to moderate right and moderate left foraminal narrowing. There is moderate to severe bilateral facet and ligamentous hypertrophy. There is a tiny amount of fluid seen within the facet joints. The constellation of findings is causing mild spinal canal stenosis.   At the L3/L4 level there is a diffuse disc bulge with an asymmetric left foraminal and far lateral broad-based disc protrusion flattening the ventral thecal sac and compressing the distal foraminal/far lateral left L3 exiting nerve root as well as contacting the left descending L4 nerve root. There is moderate right and severe left foraminal narrowing. There is moderate to severe facet and ligamentous hypertrophy. There is narrowing of the left lateral recess. The constellation of findings is causing moderate spinal canal stenosis.   At the L2/L3 level there is unroofing of the posterior aspect the disc space due to the mild anterolisthesis. There is a superimposed diffuse disc bulge and osteophyte flattening the ventral thecal sac. There is mild bilateral foraminal narrowing. There is severe facet and ligamentous hypertrophy. The constellation of findings is causing mild spinal canal stenosis.   At the L1/L2 level there is a mild diffuse disc bulge flattening the ventral thecal sac. There is moderate facet and ligamentous hypertrophy. There is mild bilateral foraminal narrowing. There is no evidence of spinal canal stenosis.   There is a small 1 cm cyst seen within the right kidney.   Impression:   Status post right L5 hemilaminectomy.   Dextroscoliosis of the lumbar spine.   Mild anterolisthesis of L2 on L3.   Trace anterolisthesis of L4 on L5.   Mild right lateral subluxation of L3 on L4 and L4 on L5.    L5/S1  diffuse disc bulge  compressing the right contacting the left L5 foraminal exiting nerve roots.  There has been prior decompression.   L4/L5 diffuse disc bulge and osteophyte flattening the ventral thecal sac and in close proximity to the distal foraminal/far lateral L4 exiting nerve root. L4/L5 mild spinal canal stenosis.   L3/L4  diffuse disc bulge with an asymmetric left foraminal and far lateral broad-based disc protrusion  compressing the distal foraminal/far lateral left L3 exiting nerve root as well as contacting the left descending L4 nerve root. L3/L4 moderate spinal canal stenosis.   L2/L3  diffuse disc bulge and osteophyte. L2/L3 mild spinal canal stenosis.   L1/L2  mild diffuse disc bulge flattening the ventral thecal sac. There is moderate facet and ligamentous hypertrophy. There is mild bilateral foraminal narrowing. No evidence of spinal canal stenosis.   Moderate chronic compression fracture of the T12 vertebral body. Mild to moderate chronic compression fracture of the L4 vertebral body   Multilevel degenerative changes of the lumbar spine. 05/13/2021 Exam: MRI of the Lumbar Spine  CLINICAL INFORMATION: Severe low back pain.  TECHNIQUE: Sagittal T1 and T2 as well as axial T2 weighted images of the lumbar spine are obtained without the use of intravenous contrast.  COMPARISON: Prior examination 06/16/2020.  FINDINGS: There is again now an old superior endplate fracture of T12. There is slight spondylolisthesis at C2-C3. There is again severe desiccation loss of heignt of all the intervertebral discs in the lumbar spine sparing L1-L2. There is again moderate-to-severe osteophyte disc complex at L2-L3 with mild-to-moderate ligamentous and moderate facet hypertrophy yielding moderate-to-severe right neural foraminal stenosis and mild-to-moderate canal stenosis. No significant left-sided neural foraminal stenosis is noted.  Again seen is a moderate osteophyte disc complex at L3-L4 with mild-to- moderate ligamentous and severe facet hypertrophy yielding severe spinal canal and severe bilateral neural foraminal stenosis.  There is again osteophyte disc complex at L4-L5 with mild ligamentous and moderate-to-severe facet hypertrophy yielding severe bilatera neural foraminal stenosis  - and moderate canal stenosis. There is facet edema which could be a further source of pain.  Again seen is moderate osteophyte disc complex at L5-S1. There is moderate-to-severe facet hypertrophy yielding severe bilateral neural foraminal stenosis but no significant canal stenosis. There is severe bilateral lateral recess stenos 3.  IMPRESSION: Multilevel degenerative disc disease.   ====================================================================================== 6/16/20  Exam: MRI of the Thoracic Spine  HISTORY: Severe back pain after lifting a grill.  FINDINGS: Examination is a MRI of the thoracic spine without contrast enhancement.  There is a subacute compression to the superior endplate of T12. There is an older mild compression to T6. The T12 compression does not demonstrate any involvement of the posterior elements, and there is approximately 10% loss of height to the T12 vertebral body. There are no signs of any epidural hematomas or any signs of any intramedullary lesions or cord compressions. No evidence for any disc herniations or any other significant findings otherwise identified.  IMPRESSION:  Subacute compression to T12 with loss of height of about 10% to the superior endplate.   ====================================================================================== 8/31/20 Bone Density  IMPRESSION: Normal bone density lumbar spine.  Risk for lumbar spine fracture is not increased. Osteopenia left hip. Risk for hip fracture is mildly increased.     [FreeTextEntry7] : Lower back pain

## 2024-04-16 NOTE — PHYSICAL EXAM
[Normal muscle bulk without asymmetry] : normal muscle bulk without asymmetry [Facet Tenderness] : no facet tenderness [Normal] : Gait: normal [] : Motor:

## 2024-04-16 NOTE — ASSESSMENT
[FreeTextEntry1] : >> Imaging and Other Studies   I personally reviewed the relevant imaging.  Discussed and explained to patient the likely source of pathology and pain.  Questions answered. XR MRI   >> Therapy and Other Modalities  start PT - referral pain  >> Medications  weaned from fentanyl   narcan available  gabapentin TID cautioned change in mood.  Encouraged to call with any worsening mood or depression/suicidal ideations  flexeril 10mg prn spasm/pain   >> Interventions   Significant component of back and leg pain likely secondary to lumbar spinal stenosis demonstrated on MRI LS.  sp L4-5 interlaminar epidural steroid injection with significant improvement  may consider repeat intervention   >> Consults   >> Discussion of Risks/Benefits/Alternatives    >Regarding any scheduled procedures:  In the event the patient is scheduled for a procedure,   I have discussed in detail with the patient that any interventional pain procedure is associated with potential risks.  The procedure may include an injection of steroids and potentially other medications (local anesthetic and normal saline) into the epidural space or surrounding tissue of the spine.  There are significant risks of this procedure which include and are not limited to infection, bleeding, worsening pain, dural puncture leading to postdural puncture headache, nerve damage, spinal cord injury, paralysis, stroke, and death.    There is a chance that the procedure does not improve their pain.    There are risks associated with the steroid being absorbed into the body systemically.  These include dysphoria, difficulty sleeping, mood swings and personality changes.  Premenopausal women may notice an irregularity in her menstrual cycle for 2-3 months following the injection.  Steroids can specifically affect patients with hypertension, diabetes, and peptic ulcers.  The procedure may cause a temporary increase in blood pressure and blood pressure, and may adversely affect a peptic ulcer.  Other, more rare complications, include avascular necrosis of joints, glaucoma and worsening of osteoporosis.   I have discussed the risks of the procedure at length with the patient, and the potential benefits of pain relief.  I have offered alternatives to the procedure.  All questions were answered.    The patient expressed understanding and wishes to proceed with the procedure.    > Longitudinal management of Complex Painful condition   The patient is being managed for a complex condition that requires ongoing management.  The nature of this condition demands nuanced approach to treatment.  The seriousness of the condition necessitates an in-depth and focused approach to management and coordination with other healthcare professionals.    This visit involves intricate evaluation and management of the patient's condition.  The complexity of the visit was due to the need for detailed assessment of the current state, consideration of potential complications and a careful balancing of treatment options to management the chronic condition effectively.   As detailed above, the patient has a chronic significant painful condition that requires regular and detailed management.  The condition's impact on the patient's quality of life and health is substantial and necessitates a comprehensive and tailored approach   >> Conclusion   There were no barriers to communication. Informed patient that I would be available for any additional questions. Patient was instructed to call with any worsening symptoms including severe pain, new numbness/weakness, or changes in the bowel/bladder function. Discussed role of nsaids in pain management and all relevant risks, if patient is continuing to require after 4 weeks the patient should f/u for alternative treatment. Instructed patient to maintain pain diary to monitor pain level, mobility, and function.

## 2024-05-10 ENCOUNTER — APPOINTMENT (OUTPATIENT)
Dept: PAIN MANAGEMENT | Facility: CLINIC | Age: 74
End: 2024-05-10
Payer: MEDICARE

## 2024-05-10 VITALS
HEIGHT: 72 IN | SYSTOLIC BLOOD PRESSURE: 109 MMHG | BODY MASS INDEX: 22.48 KG/M2 | WEIGHT: 166 LBS | DIASTOLIC BLOOD PRESSURE: 70 MMHG

## 2024-05-10 PROCEDURE — 99214 OFFICE O/P EST MOD 30 MIN: CPT

## 2024-05-10 PROCEDURE — G2211 COMPLEX E/M VISIT ADD ON: CPT

## 2024-05-10 NOTE — HISTORY OF PRESENT ILLNESS
[Back Pain] : back pain [___ yrs] : [unfilled] year(s) ago [Constant] : constant [7] : an average pain level of 7/10 [6] : a minimum pain level of 6/10 [10] : a maximum pain level of 10/10 [Throbbing] : throbbing [Shooting] : shooting [Walking] : walking [Bending] : bending [Medications] : medications [FreeTextEntry1] : Interval Note:  Patient reports that he has significant bilateral back and buttock, leg pain.  Pain is so bad that patient finds it difficult to perform adls and ambulate. Of note he reports that he is likely not on plavix anymore after CABG.  Since last visit the pain is improved. Denies any additional weakness, numbness, bowel/bladder dysfunction.     HPI     Mr. JEROMY JAMES is a 73 year M sp CABG on baby ASA but not on plavix with pmhx of prostate CA (s/p prostatectomy 9/22), colon CA (s/p resection 1/22), hx chronic pancreatitis, HTN, CAD with stent x 2 (1998, 2008). C/o axial lower back pain that radiates to left thigh and right buttock. Maintained on opiates and NSAIDs per Dr Davis for 20 yrs. Reports the [place] practice is closing and is seeking a new pain physician. Denies any additional weakness, numbness, bowel/bladder dysfunction, history of bleeding disorders.    Previous and current pain medications/doses/effects: Fentanyl patch 50mg, Hydrocodone 5/325 prn, meloxicam    Previous Pain Treatments:      Previous Pain Injections:   L4-5 interlaminar epidural steroid injection 1/31/24  Previous Diagnostic Studies/Images:  MRI LS 1/24  The patient is status post prior right L5 princess-laminectomy.   There is dextroscoliosis of the lumbar spine. There is mild anterolisthesis of L2 on L3. There is trace anterolisthesis of L4 on L5. There is mild right lateral subluxation of L3 on L4 and L4 on L5.  There is a moderate chronic compression fracture of the T12 vertebral body. There is a mild to moderate chronic compression fracture of the L4 vertebral body. There is mild retropulsion of the posterior aspect of the superior endplate of T12 that is flattening the ventral thecal sac, chronic. There are small Schmorl's nodes seen at the superior and inferior endplates of L4 vertebral body. The remaining vertebral bodies are normal height. There is severe loss of disc height at the L5/S1 disc space with anterior posterior osteophyte formation and Modic 2 and 3 adjacent endplate changes consistent with desiccation and degenerative disease. There is moderate loss of disc height and T2 signal at the L2/L3, L3/L4 and L4/L5 disc spaces with mild adjacent Modic 2 endplate changes and small anterior minimal posterior osteophyte formation consistent with desiccation and degenerative disease. There is vacuum phenomenon seen within the L2/L3, L3/L4, L4/L5, L5/S1 disc spaces consistent with degenerative changes. The L1/L2 disc spaces demonstrates desiccation. The conus terminates at the L1 level and demonstrates no evidence of abnormal signal changes.   Evaluation of the individual levels demonstrates at the L5/S1 level there is a diffuse disc bulge flattening the ventral thecal sac compressing the right contacting the left L5 foraminal exiting nerve roots. There is severe right and moderate to severe left foraminal narrowing. There is moderate facet degenerative changes, right greater than left. There has been prior decompression.   At the L4/L5 level there is minimal unroofing of the posterior aspect the disc space due to the anterolisthesis. There is a diffuse disc bulge and osteophyte flattening the ventral thecal sac and in close proximity to the distal foraminal/far lateral L4 exiting nerve root. The right L4 foraminal exiting nerve root is within normal limits. There is mild to moderate right and moderate left foraminal narrowing. There is moderate to severe bilateral facet and ligamentous hypertrophy. There is a tiny amount of fluid seen within the facet joints. The constellation of findings is causing mild spinal canal stenosis.   At the L3/L4 level there is a diffuse disc bulge with an asymmetric left foraminal and far lateral broad-based disc protrusion flattening the ventral thecal sac and compressing the distal foraminal/far lateral left L3 exiting nerve root as well as contacting the left descending L4 nerve root. There is moderate right and severe left foraminal narrowing. There is moderate to severe facet and ligamentous hypertrophy. There is narrowing of the left lateral recess. The constellation of findings is causing moderate spinal canal stenosis.   At the L2/L3 level there is unroofing of the posterior aspect the disc space due to the mild anterolisthesis. There is a superimposed diffuse disc bulge and osteophyte flattening the ventral thecal sac. There is mild bilateral foraminal narrowing. There is severe facet and ligamentous hypertrophy. The constellation of findings is causing mild spinal canal stenosis.   At the L1/L2 level there is a mild diffuse disc bulge flattening the ventral thecal sac. There is moderate facet and ligamentous hypertrophy. There is mild bilateral foraminal narrowing. There is no evidence of spinal canal stenosis.   There is a small 1 cm cyst seen within the right kidney.   Impression:   Status post right L5 hemilaminectomy.   Dextroscoliosis of the lumbar spine.   Mild anterolisthesis of L2 on L3.   Trace anterolisthesis of L4 on L5.   Mild right lateral subluxation of L3 on L4 and L4 on L5.    L5/S1  diffuse disc bulge  compressing the right contacting the left L5 foraminal exiting nerve roots.  There has been prior decompression.   L4/L5 diffuse disc bulge and osteophyte flattening the ventral thecal sac and in close proximity to the distal foraminal/far lateral L4 exiting nerve root. L4/L5 mild spinal canal stenosis.   L3/L4  diffuse disc bulge with an asymmetric left foraminal and far lateral broad-based disc protrusion  compressing the distal foraminal/far lateral left L3 exiting nerve root as well as contacting the left descending L4 nerve root. L3/L4 moderate spinal canal stenosis.   L2/L3  diffuse disc bulge and osteophyte. L2/L3 mild spinal canal stenosis.   L1/L2  mild diffuse disc bulge flattening the ventral thecal sac. There is moderate facet and ligamentous hypertrophy. There is mild bilateral foraminal narrowing. No evidence of spinal canal stenosis.   Moderate chronic compression fracture of the T12 vertebral body. Mild to moderate chronic compression fracture of the L4 vertebral body   Multilevel degenerative changes of the lumbar spine. 05/13/2021 Exam: MRI of the Lumbar Spine  CLINICAL INFORMATION: Severe low back pain.  TECHNIQUE: Sagittal T1 and T2 as well as axial T2 weighted images of the lumbar spine are obtained without the use of intravenous contrast.  COMPARISON: Prior examination 06/16/2020.  FINDINGS: There is again now an old superior endplate fracture of T12. There is slight spondylolisthesis at C2-C3. There is again severe desiccation loss of heignt of all the intervertebral discs in the lumbar spine sparing L1-L2. There is again moderate-to-severe osteophyte disc complex at L2-L3 with mild-to-moderate ligamentous and moderate facet hypertrophy yielding moderate-to-severe right neural foraminal stenosis and mild-to-moderate canal stenosis. No significant left-sided neural foraminal stenosis is noted.  Again seen is a moderate osteophyte disc complex at L3-L4 with mild-to- moderate ligamentous and severe facet hypertrophy yielding severe spinal canal and severe bilateral neural foraminal stenosis.  There is again osteophyte disc complex at L4-L5 with mild ligamentous and moderate-to-severe facet hypertrophy yielding severe bilatera neural foraminal stenosis  - and moderate canal stenosis. There is facet edema which could be a further source of pain.  Again seen is moderate osteophyte disc complex at L5-S1. There is moderate-to-severe facet hypertrophy yielding severe bilateral neural foraminal stenosis but no significant canal stenosis. There is severe bilateral lateral recess stenos 3.  IMPRESSION: Multilevel degenerative disc disease.   ====================================================================================== 6/16/20  Exam: MRI of the Thoracic Spine  HISTORY: Severe back pain after lifting a grill.  FINDINGS: Examination is a MRI of the thoracic spine without contrast enhancement.  There is a subacute compression to the superior endplate of T12. There is an older mild compression to T6. The T12 compression does not demonstrate any involvement of the posterior elements, and there is approximately 10% loss of height to the T12 vertebral body. There are no signs of any epidural hematomas or any signs of any intramedullary lesions or cord compressions. No evidence for any disc herniations or any other significant findings otherwise identified.  IMPRESSION:  Subacute compression to T12 with loss of height of about 10% to the superior endplate.   ====================================================================================== 8/31/20 Bone Density  IMPRESSION: Normal bone density lumbar spine.  Risk for lumbar spine fracture is not increased. Osteopenia left hip. Risk for hip fracture is mildly increased.     [FreeTextEntry7] : Lower back pain

## 2024-05-10 NOTE — ASSESSMENT
[FreeTextEntry1] : >> Imaging and Other Studies   I personally reviewed the relevant imaging.  Discussed and explained to patient the likely source of pathology and pain.  Questions answered. XR MRI   >> Therapy and Other Modalities  PT  >> Medications  weaned from fentanyl   gabapentin TID cautioned change in mood.  Encouraged to call with any worsening mood or depression/suicidal ideations  flexeril 10mg prn spasm/pain   >> Interventions   Significant component of back and leg pain likely secondary to lumbar spinal stenosis demonstrated on MRI LS.  sp L4-5 interlaminar epidural steroid injection with significant improvement  given efficacy of previous intervention that is >80% improvement in pain and improved ability to perform adls, and return of pain despite conservative treatment, will schedule repeat  L4-5 interlaminar epidural steroid injection patient will verify prior to intervention that he is no longer taking plavix  continue asa 81 for secondary prophylaxis     >> Consults   >> Discussion of Risks/Benefits/Alternatives    >Regarding any scheduled procedures:  In the event the patient is scheduled for a procedure,   I have discussed in detail with the patient that any interventional pain procedure is associated with potential risks.  The procedure may include an injection of steroids and potentially other medications (local anesthetic and normal saline) into the epidural space or surrounding tissue of the spine.  There are significant risks of this procedure which include and are not limited to infection, bleeding, worsening pain, dural puncture leading to postdural puncture headache, nerve damage, spinal cord injury, paralysis, stroke, and death.    There is a chance that the procedure does not improve their pain.    There are risks associated with the steroid being absorbed into the body systemically.  These include dysphoria, difficulty sleeping, mood swings and personality changes.  Premenopausal women may notice an irregularity in her menstrual cycle for 2-3 months following the injection.  Steroids can specifically affect patients with hypertension, diabetes, and peptic ulcers.  The procedure may cause a temporary increase in blood pressure and blood pressure, and may adversely affect a peptic ulcer.  Other, more rare complications, include avascular necrosis of joints, glaucoma and worsening of osteoporosis.   I have discussed the risks of the procedure at length with the patient, and the potential benefits of pain relief.  I have offered alternatives to the procedure.  All questions were answered.    The patient expressed understanding and wishes to proceed with the procedure.    > Longitudinal management of Complex Painful condition   The patient is being managed for a complex condition that requires ongoing management.  The nature of this condition demands nuanced approach to treatment.  The seriousness of the condition necessitates an in-depth and focused approach to management and coordination with other healthcare professionals.    This visit involves intricate evaluation and management of the patient's condition.  The complexity of the visit was due to the need for detailed assessment of the current state, consideration of potential complications and a careful balancing of treatment options to management the chronic condition effectively.   As detailed above, the patient has a chronic significant painful condition that requires regular and detailed management.  The condition's impact on the patient's quality of life and health is substantial and necessitates a comprehensive and tailored approach   >> Conclusion   There were no barriers to communication. Informed patient that I would be available for any additional questions. Patient was instructed to call with any worsening symptoms including severe pain, new numbness/weakness, or changes in the bowel/bladder function. Discussed role of nsaids in pain management and all relevant risks, if patient is continuing to require after 4 weeks the patient should f/u for alternative treatment. Instructed patient to maintain pain diary to monitor pain level, mobility, and function.

## 2024-05-15 ENCOUNTER — APPOINTMENT (OUTPATIENT)
Dept: PAIN MANAGEMENT | Facility: CLINIC | Age: 74
End: 2024-05-15
Payer: MEDICARE

## 2024-05-15 VITALS
RESPIRATION RATE: 18 BRPM | HEART RATE: 81 BPM | DIASTOLIC BLOOD PRESSURE: 77 MMHG | OXYGEN SATURATION: 98 % | SYSTOLIC BLOOD PRESSURE: 122 MMHG

## 2024-05-15 PROCEDURE — 62323 NJX INTERLAMINAR LMBR/SAC: CPT

## 2024-05-15 RX ADMIN — TRIAMCINOLONE ACETONIDE 0 MG/ML: 80 INJECTION, SUSPENSION INTRA-ARTICULAR; INTRAMUSCULAR at 00:00

## 2024-05-15 RX ADMIN — LIDOCAINE HYDROCHLORIDE %: 10 INJECTION, SOLUTION INFILTRATION; PERINEURAL at 00:00

## 2024-05-15 RX ADMIN — IOHEXOL 0 MG/ML: 180 INJECTION INTRAVENOUS at 00:00

## 2024-05-29 ENCOUNTER — APPOINTMENT (OUTPATIENT)
Dept: PAIN MANAGEMENT | Facility: CLINIC | Age: 74
End: 2024-05-29
Payer: MEDICARE

## 2024-05-29 VITALS
SYSTOLIC BLOOD PRESSURE: 122 MMHG | BODY MASS INDEX: 22.48 KG/M2 | WEIGHT: 166 LBS | HEIGHT: 72 IN | DIASTOLIC BLOOD PRESSURE: 76 MMHG

## 2024-05-29 DIAGNOSIS — M54.16 RADICULOPATHY, LUMBAR REGION: ICD-10-CM

## 2024-05-29 DIAGNOSIS — M47.817 SPONDYLOSIS W/OUT MYELOPATHY OR RADICULOPATHY, LUMBOSACRAL REGION: ICD-10-CM

## 2024-05-29 DIAGNOSIS — G89.4 CHRONIC PAIN SYNDROME: ICD-10-CM

## 2024-05-29 DIAGNOSIS — M79.2 NEURALGIA AND NEURITIS, UNSPECIFIED: ICD-10-CM

## 2024-05-29 DIAGNOSIS — M79.18 MYALGIA, OTHER SITE: ICD-10-CM

## 2024-05-29 PROCEDURE — G2211 COMPLEX E/M VISIT ADD ON: CPT

## 2024-05-29 PROCEDURE — 99214 OFFICE O/P EST MOD 30 MIN: CPT

## 2024-05-29 NOTE — ASSESSMENT
[FreeTextEntry1] : >> Imaging and Other Studies   I personally reviewed the relevant imaging.  Discussed and explained to patient the likely source of pathology and pain.  Questions answered. XR MRI   >> Therapy and Other Modalities  PT  >> Medications  weaned from fentanyl   gabapentin TID cautioned change in mood.  Encouraged to call with any worsening mood or depression/suicidal ideations  flexeril 10mg prn spasm/pain   >> Interventions   Significant component of back and leg pain likely secondary to lumbar spinal stenosis demonstrated on MRI LS.  sp L4-5 interlaminar epidural steroid injection with significant improvement x 2  Significant component of axial back pain secondary to lumbar spondylosis and facet arthropathy demonstrated on MRI LS.  Pain refractory to conservative treatments.  Will schedule BILATERAL L4-sacral ala diagnostic medial branch block (2 joints, 3 nerves on each side) r/b/a discussed  May consider radiofreqency ablation    >> Consults   >> Discussion of Risks/Benefits/Alternatives    >Regarding any scheduled procedures:  In the event the patient is scheduled for a procedure,   I have discussed in detail with the patient that any interventional pain procedure is associated with potential risks.  The procedure may include an injection of steroids and potentially other medications (local anesthetic and normal saline) into the epidural space or surrounding tissue of the spine.  There are significant risks of this procedure which include and are not limited to infection, bleeding, worsening pain, dural puncture leading to postdural puncture headache, nerve damage, spinal cord injury, paralysis, stroke, and death.    There is a chance that the procedure does not improve their pain.    There are risks associated with the steroid being absorbed into the body systemically.  These include dysphoria, difficulty sleeping, mood swings and personality changes.  Premenopausal women may notice an irregularity in her menstrual cycle for 2-3 months following the injection.  Steroids can specifically affect patients with hypertension, diabetes, and peptic ulcers.  The procedure may cause a temporary increase in blood pressure and blood pressure, and may adversely affect a peptic ulcer.  Other, more rare complications, include avascular necrosis of joints, glaucoma and worsening of osteoporosis.   I have discussed the risks of the procedure at length with the patient, and the potential benefits of pain relief.  I have offered alternatives to the procedure.  All questions were answered.    The patient expressed understanding and wishes to proceed with the procedure.    > Longitudinal management of Complex Painful condition   The patient is being managed for a complex condition that requires ongoing management.  The nature of this condition demands nuanced approach to treatment.  The seriousness of the condition necessitates an in-depth and focused approach to management and coordination with other healthcare professionals.    This visit involves intricate evaluation and management of the patient's condition.  The complexity of the visit was due to the need for detailed assessment of the current state, consideration of potential complications and a careful balancing of treatment options to management the chronic condition effectively.   As detailed above, the patient has a chronic significant painful condition that requires regular and detailed management.  The condition's impact on the patient's quality of life and health is substantial and necessitates a comprehensive and tailored approach   >> Conclusion   There were no barriers to communication. Informed patient that I would be available for any additional questions. Patient was instructed to call with any worsening symptoms including severe pain, new numbness/weakness, or changes in the bowel/bladder function. Discussed role of nsaids in pain management and all relevant risks, if patient is continuing to require after 4 weeks the patient should f/u for alternative treatment. Instructed patient to maintain pain diary to monitor pain level, mobility, and function.

## 2024-05-29 NOTE — HISTORY OF PRESENT ILLNESS
[Back Pain] : back pain [___ yrs] : [unfilled] year(s) ago [Constant] : constant [7] : an average pain level of 7/10 [6] : a minimum pain level of 6/10 [10] : a maximum pain level of 10/10 [Throbbing] : throbbing [Shooting] : shooting [Walking] : walking [Bending] : bending [Medications] : medications [FreeTextEntry1] : Interval Note:  sp L4-5 interlaminar epidural steroid injection without lasting improvement in axial back pain. Worse with transition. Patient reports that he has significant bilateral back.  No leg pain. Pain is so bad that patient finds it difficult to perform adls and ambulate. Of note he reports that he is likely not on plavix anymore after CABG.  Since last visit the pain is improved. Denies any additional weakness, numbness, bowel/bladder dysfunction.     HPI     Mr. JEROMY JAMES is a 73 year M sp CABG on baby ASA but not on plavix with pmhx of prostate CA (s/p prostatectomy 9/22), colon CA (s/p resection 1/22), hx chronic pancreatitis, HTN, CAD with stent x 2 (1998, 2008). C/o axial lower back pain that radiates to left thigh and right buttock. Maintained on opiates and NSAIDs per Dr Davis for 20 yrs. Reports the [facility] practice is closing and is seeking a new pain physician. Denies any additional weakness, numbness, bowel/bladder dysfunction, history of bleeding disorders.    Previous and current pain medications/doses/effects: Fentanyl patch 50mg, Hydrocodone 5/325 prn, meloxicam    Previous Pain Treatments:      Previous Pain Injections:   L4-5 interlaminar epidural steroid injection 5/15/24  L4-5 interlaminar epidural steroid injection 1/31/24  Previous Diagnostic Studies/Images:  MRI LS 1/24  The patient is status post prior right L5 princess-laminectomy.   There is dextroscoliosis of the lumbar spine. There is mild anterolisthesis of L2 on L3. There is trace anterolisthesis of L4 on L5. There is mild right lateral subluxation of L3 on L4 and L4 on L5.  There is a moderate chronic compression fracture of the T12 vertebral body. There is a mild to moderate chronic compression fracture of the L4 vertebral body. There is mild retropulsion of the posterior aspect of the superior endplate of T12 that is flattening the ventral thecal sac, chronic. There are small Schmorl's nodes seen at the superior and inferior endplates of L4 vertebral body. The remaining vertebral bodies are normal height. There is severe loss of disc height at the L5/S1 disc space with anterior posterior osteophyte formation and Modic 2 and 3 adjacent endplate changes consistent with desiccation and degenerative disease. There is moderate loss of disc height and T2 signal at the L2/L3, L3/L4 and L4/L5 disc spaces with mild adjacent Modic 2 endplate changes and small anterior minimal posterior osteophyte formation consistent with desiccation and degenerative disease. There is vacuum phenomenon seen within the L2/L3, L3/L4, L4/L5, L5/S1 disc spaces consistent with degenerative changes. The L1/L2 disc spaces demonstrates desiccation. The conus terminates at the L1 level and demonstrates no evidence of abnormal signal changes.   Evaluation of the individual levels demonstrates at the L5/S1 level there is a diffuse disc bulge flattening the ventral thecal sac compressing the right contacting the left L5 foraminal exiting nerve roots. There is severe right and moderate to severe left foraminal narrowing. There is moderate facet degenerative changes, right greater than left. There has been prior decompression.   At the L4/L5 level there is minimal unroofing of the posterior aspect the disc space due to the anterolisthesis. There is a diffuse disc bulge and osteophyte flattening the ventral thecal sac and in close proximity to the distal foraminal/far lateral L4 exiting nerve root. The right L4 foraminal exiting nerve root is within normal limits. There is mild to moderate right and moderate left foraminal narrowing. There is moderate to severe bilateral facet and ligamentous hypertrophy. There is a tiny amount of fluid seen within the facet joints. The constellation of findings is causing mild spinal canal stenosis.   At the L3/L4 level there is a diffuse disc bulge with an asymmetric left foraminal and far lateral broad-based disc protrusion flattening the ventral thecal sac and compressing the distal foraminal/far lateral left L3 exiting nerve root as well as contacting the left descending L4 nerve root. There is moderate right and severe left foraminal narrowing. There is moderate to severe facet and ligamentous hypertrophy. There is narrowing of the left lateral recess. The constellation of findings is causing moderate spinal canal stenosis.   At the L2/L3 level there is unroofing of the posterior aspect the disc space due to the mild anterolisthesis. There is a superimposed diffuse disc bulge and osteophyte flattening the ventral thecal sac. There is mild bilateral foraminal narrowing. There is severe facet and ligamentous hypertrophy. The constellation of findings is causing mild spinal canal stenosis.   At the L1/L2 level there is a mild diffuse disc bulge flattening the ventral thecal sac. There is moderate facet and ligamentous hypertrophy. There is mild bilateral foraminal narrowing. There is no evidence of spinal canal stenosis.   There is a small 1 cm cyst seen within the right kidney.   Impression:   Status post right L5 hemilaminectomy.   Dextroscoliosis of the lumbar spine.   Mild anterolisthesis of L2 on L3.   Trace anterolisthesis of L4 on L5.   Mild right lateral subluxation of L3 on L4 and L4 on L5.    L5/S1  diffuse disc bulge  compressing the right contacting the left L5 foraminal exiting nerve roots.  There has been prior decompression.   L4/L5 diffuse disc bulge and osteophyte flattening the ventral thecal sac and in close proximity to the distal foraminal/far lateral L4 exiting nerve root. L4/L5 mild spinal canal stenosis.   L3/L4  diffuse disc bulge with an asymmetric left foraminal and far lateral broad-based disc protrusion  compressing the distal foraminal/far lateral left L3 exiting nerve root as well as contacting the left descending L4 nerve root. L3/L4 moderate spinal canal stenosis.   L2/L3  diffuse disc bulge and osteophyte. L2/L3 mild spinal canal stenosis.   L1/L2  mild diffuse disc bulge flattening the ventral thecal sac. There is moderate facet and ligamentous hypertrophy. There is mild bilateral foraminal narrowing. No evidence of spinal canal stenosis.   Moderate chronic compression fracture of the T12 vertebral body. Mild to moderate chronic compression fracture of the L4 vertebral body   Multilevel degenerative changes of the lumbar spine. 05/13/2021 Exam: MRI of the Lumbar Spine  CLINICAL INFORMATION: Severe low back pain.  TECHNIQUE: Sagittal T1 and T2 as well as axial T2 weighted images of the lumbar spine are obtained without the use of intravenous contrast.  COMPARISON: Prior examination 06/16/2020.  FINDINGS: There is again now an old superior endplate fracture of T12. There is slight spondylolisthesis at C2-C3. There is again severe desiccation loss of heignt of all the intervertebral discs in the lumbar spine sparing L1-L2. There is again moderate-to-severe osteophyte disc complex at L2-L3 with mild-to-moderate ligamentous and moderate facet hypertrophy yielding moderate-to-severe right neural foraminal stenosis and mild-to-moderate canal stenosis. No significant left-sided neural foraminal stenosis is noted.  Again seen is a moderate osteophyte disc complex at L3-L4 with mild-to- moderate ligamentous and severe facet hypertrophy yielding severe spinal canal and severe bilateral neural foraminal stenosis.  There is again osteophyte disc complex at L4-L5 with mild ligamentous and moderate-to-severe facet hypertrophy yielding severe bilatera neural foraminal stenosis  - and moderate canal stenosis. There is facet edema which could be a further source of pain.  Again seen is moderate osteophyte disc complex at L5-S1. There is moderate-to-severe facet hypertrophy yielding severe bilateral neural foraminal stenosis but no significant canal stenosis. There is severe bilateral lateral recess stenos 3.  IMPRESSION: Multilevel degenerative disc disease.   ====================================================================================== 6/16/20  Exam: MRI of the Thoracic Spine  HISTORY: Severe back pain after lifting a grill.  FINDINGS: Examination is a MRI of the thoracic spine without contrast enhancement.  There is a subacute compression to the superior endplate of T12. There is an older mild compression to T6. The T12 compression does not demonstrate any involvement of the posterior elements, and there is approximately 10% loss of height to the T12 vertebral body. There are no signs of any epidural hematomas or any signs of any intramedullary lesions or cord compressions. No evidence for any disc herniations or any other significant findings otherwise identified.  IMPRESSION:  Subacute compression to T12 with loss of height of about 10% to the superior endplate.   ====================================================================================== 8/31/20 Bone Density  IMPRESSION: Normal bone density lumbar spine.  Risk for lumbar spine fracture is not increased. Osteopenia left hip. Risk for hip fracture is mildly increased.     [FreeTextEntry7] : Lower back pain

## 2024-06-10 ENCOUNTER — NON-APPOINTMENT (OUTPATIENT)
Age: 74
End: 2024-06-10

## 2024-06-13 RX ORDER — GABAPENTIN 300 MG/1
300 CAPSULE ORAL 3 TIMES DAILY
Qty: 90 | Refills: 1 | Status: ACTIVE | COMMUNITY
Start: 2023-12-15 | End: 1900-01-01

## 2024-06-13 RX ORDER — CYCLOBENZAPRINE HYDROCHLORIDE 10 MG/1
10 TABLET, FILM COATED ORAL
Qty: 90 | Refills: 1 | Status: ACTIVE | COMMUNITY
Start: 2024-04-16 | End: 1900-01-01

## 2024-07-09 ENCOUNTER — APPOINTMENT (OUTPATIENT)
Dept: PAIN MANAGEMENT | Facility: CLINIC | Age: 74
End: 2024-07-09
Payer: MEDICARE

## 2024-07-09 VITALS
RESPIRATION RATE: 15 BRPM | HEART RATE: 87 BPM | OXYGEN SATURATION: 98 % | SYSTOLIC BLOOD PRESSURE: 126 MMHG | DIASTOLIC BLOOD PRESSURE: 81 MMHG

## 2024-07-09 PROCEDURE — 64494 INJ PARAVERT F JNT L/S 2 LEV: CPT | Mod: 50

## 2024-07-09 PROCEDURE — 64493 INJ PARAVERT F JNT L/S 1 LEV: CPT | Mod: 50

## 2024-07-09 RX ADMIN — BUPIVACAINE HYDROCHLORIDE 0 %: 7.5 INJECTION, SOLUTION EPIDURAL; RETROBULBAR at 00:00

## 2024-07-11 ENCOUNTER — APPOINTMENT (OUTPATIENT)
Dept: PAIN MANAGEMENT | Facility: CLINIC | Age: 74
End: 2024-07-11
Payer: MEDICARE

## 2024-07-11 DIAGNOSIS — M79.18 MYALGIA, OTHER SITE: ICD-10-CM

## 2024-07-11 DIAGNOSIS — M79.2 NEURALGIA AND NEURITIS, UNSPECIFIED: ICD-10-CM

## 2024-07-11 DIAGNOSIS — M47.817 SPONDYLOSIS W/OUT MYELOPATHY OR RADICULOPATHY, LUMBOSACRAL REGION: ICD-10-CM

## 2024-07-11 DIAGNOSIS — G89.4 CHRONIC PAIN SYNDROME: ICD-10-CM

## 2024-07-11 PROCEDURE — 99214 OFFICE O/P EST MOD 30 MIN: CPT

## 2024-07-11 PROCEDURE — G2211 COMPLEX E/M VISIT ADD ON: CPT

## 2024-07-17 ENCOUNTER — APPOINTMENT (OUTPATIENT)
Dept: PAIN MANAGEMENT | Facility: CLINIC | Age: 74
End: 2024-07-17
Payer: MEDICARE

## 2024-07-17 VITALS
HEART RATE: 84 BPM | DIASTOLIC BLOOD PRESSURE: 84 MMHG | SYSTOLIC BLOOD PRESSURE: 134 MMHG | OXYGEN SATURATION: 98 % | RESPIRATION RATE: 15 BRPM

## 2024-07-17 DIAGNOSIS — M54.16 RADICULOPATHY, LUMBAR REGION: ICD-10-CM

## 2024-07-17 PROCEDURE — 62323 NJX INTERLAMINAR LMBR/SAC: CPT

## 2024-07-17 RX ADMIN — LIDOCAINE HYDROCHLORIDE %: 10 INJECTION, SOLUTION INFILTRATION; PERINEURAL at 00:00

## 2024-07-17 RX ADMIN — TRIAMCINOLONE ACETONIDE 0 MG/ML: 80 INJECTION, SUSPENSION INTRA-ARTICULAR; INTRAMUSCULAR at 00:00

## 2024-07-17 RX ADMIN — IOHEXOL 0 MG/ML: 180 INJECTION INTRAVENOUS at 00:00

## 2024-07-19 DIAGNOSIS — Z85.46 PERSONAL HISTORY OF MALIGNANT NEOPLASM OF PROSTATE: ICD-10-CM

## 2024-07-19 NOTE — PHYSICAL EXAM
[Restricted in physically strenuous activity but ambulatory and able to carry out work of a light or sedentary nature] : Status 1- Restricted in physically strenuous activity but ambulatory and able to carry out work of a light or sedentary nature, e.g., light house work, office work [Normal] : affect appropriate [de-identified] : midline scar. Well healing. some scabs [de-identified] : supra umbilical scar

## 2024-07-19 NOTE — HISTORY OF PRESENT ILLNESS
[T: ___] : T[unfilled] [N: ___] : N[unfilled] [M: ___] : M[unfilled] [AJCC Stage: ____] : AJCC Stage: [unfilled] [de-identified] : Mr. Hever Zimmerman is a 74-year-old male who is referred by Dr. Mercedes Hamilton for initial consultation for stage IIa adenocarcinoma of the colon. Mr. Hernández was referred for colonoscopy in December 2021 to evaluate rectal bleeding.  Colonoscopy on December 3, 2021 revealed a small nodular mass in the sigmoid colon at 25 cm. Pathology confirmed a invasive moderately differentiated adenocarcinoma. CT of the abdomen pelvis on December 3, 2021 a eccentric mass within the proximal sigmoid colon measuring approximately 4 cm in length and probable left lobe hepatic cysts. CT of the chest on January 3, 2022 showed fibrocalcific changes at the right apex with stable fibrotic nodular density which were unchanged since prior CT chest from June 2017  He underwent sigmoidectomy on January 6, 2022 showed a Stage IIB moderately differentiated adenocarcinoma measuring 2.5 cm with invasion through the muscularis propria into pericolorectal tissue (T3N0Mx) with mismatch repair proteins results showing intact nuclear expression. Chemotherapy was deferred. He has been under surveillance  He is also status post robotic assisted laparoscopic radical prostatectomy with pelvic lymph node dissection pathology revealed a pT3aN0 [place] 4+3 prostate cancer. 9/2022 Bone scan done on 9/2/2022 was negative. He continues to follow with urology Dr. Edwards. PSA has been undetectable. Due to be seen this month  CT CAP 2/1/23 revealing no evidence of metastatic disease or pathologic adenopathy. Evidence of chronic pancreatitis with chronic large intraductal calcification and associated ductal dilatation.  7/2023: colonoscopy- 2 small polyps removed. benign. otherwise, normal. - Dr. Johnson.       [de-identified] : Patient presents today for routine follow up for Stage II Colon Cancer on observation. Had triple bypass 2/2024. Still recovering. Has some fecal incontinence s/p prostatectomy but otherwise denies any other comlaints. Was  for lumbar radiculopathy since his previous pain management physician is closing. Now followed by Pain management at [place]. He was weaned off fentanyl and currently on gabapentin.  Repeat CT scans 2/2024 RICKEY.

## 2024-07-19 NOTE — HISTORY OF PRESENT ILLNESS
[T: ___] : T[unfilled] [N: ___] : N[unfilled] [M: ___] : M[unfilled] [AJCC Stage: ____] : AJCC Stage: [unfilled] [de-identified] : Mr. Hever Zimmerman is a 74-year-old male who is referred by Dr. Mercedes Hamilton for initial consultation for stage IIa adenocarcinoma of the colon. Mr. Hernández was referred for colonoscopy in December 2021 to evaluate rectal bleeding.  Colonoscopy on December 3, 2021 revealed a small nodular mass in the sigmoid colon at 25 cm. Pathology confirmed a invasive moderately differentiated adenocarcinoma. CT of the abdomen pelvis on December 3, 2021 a eccentric mass within the proximal sigmoid colon measuring approximately 4 cm in length and probable left lobe hepatic cysts. CT of the chest on January 3, 2022 showed fibrocalcific changes at the right apex with stable fibrotic nodular density which were unchanged since prior CT chest from June 2017  He underwent sigmoidectomy on January 6, 2022 showed a Stage IIB moderately differentiated adenocarcinoma measuring 2.5 cm with invasion through the muscularis propria into pericolorectal tissue (T3N0Mx) with mismatch repair proteins results showing intact nuclear expression. Chemotherapy was deferred. He has been under surveillance  He is also status post robotic assisted laparoscopic radical prostatectomy with pelvic lymph node dissection pathology revealed a pT3aN0 [place] 4+3 prostate cancer. 9/2022 Bone scan done on 9/2/2022 was negative. He continues to follow with urology Dr. Edwards. PSA has been undetectable. Due to be seen this month  CT CAP 2/1/23 revealing no evidence of metastatic disease or pathologic adenopathy. Evidence of chronic pancreatitis with chronic large intraductal calcification and associated ductal dilatation.  7/2023: colonoscopy- 2 small polyps removed. benign. otherwise, normal. - Dr. Johnson.       [de-identified] : Patient presents today for routine follow up for Stage II Colon Cancer on observation. Had triple bypass 2/2024. Still recovering. Has some fecal incontinence s/p prostatectomy but otherwise denies any other comlaints. Was  for lumbar radiculopathy since his previous pain management physician is closing. Now followed by Pain management at [place]. He was weaned off fentanyl and currently on gabapentin.  Repeat CT scans 2/2024 RICKEY.

## 2024-07-19 NOTE — ASSESSMENT
[FreeTextEntry1] : 74-year-old gentleman who was found to have a stage IIA (T3, N0, M0) resected colon cancer on 1/6/22 with an intact MMR panel with a low probability of MSI-H and no evident high risk features. Given this finding in conjunction with his age and comorbidities it is not likely that he will get substantial benefit from adjuvant chemotherapy. While single agent capecitabine may be considered the overall benefit is likely to be well short of 5%, especially in the 70 and over age group. Chemotherapy deferred   Of note he has a hx of [place] 4+3 prostate cancer s/p robotic assisted laparoscopic radical prostatectomy in ? 9/29/2022 Bone scan done on 9/2/2022 was negative.  CT CAP 2/5/24 no evidence of metastatic disease or pathologic adenopathy. Evidence of chronic pancreatitis with chronic large intraductal calcification and associated ductal dilatation.  Genetic testing negative  Plan: - H/O Colon Cancer and Prostate Cancer on observation - Continue to monitor CEA/PSA - Continue follow up with urology for the management of prostate cancer. PSA has been undetectable - CT CAP 2/5/24 neg for malignancy. Chronic pancreatitis-forwarded to GI..Annual imaging. Will reorder annual CT Chest, CT Abd/pelvis for 2/25 - follow up with GI for surveillance colonoscopies (colonoscopy 6/21/23) - discussed that if abd discomfort continues he should follow up with GI - Continue routine, age-appropriate, healthcare maintenance - Office visit in 4 months with repeat scans in 2/2025.

## 2024-07-19 NOTE — ASSESSMENT
[FreeTextEntry1] : 74-year-old gentleman who was found to have a stage IIA (T3, N0, M0) resected colon cancer on 1/6/22 with an intact MMR panel with a low probability of MSI-H and no evident high risk features. Given this finding in conjunction with his age and comorbidities it is not likely that he will get substantial benefit from adjuvant chemotherapy. While single agent capecitabine may be considered the overall benefit is likely to be well short of 5%, especially in the 70 and over age group. Chemotherapy deferred   Of note he has a hx of [place] 4+3 prostate cancer s/p robotic assisted laparoscopic radical prostatectomy in ? 9/29/2022 Bone scan done on 9/2/2022 was negative.  CT CAP 2/5/24 no evidence of metastatic disease or pathologic adenopathy. Evidence of chronic pancreatitis with chronic large intraductal calcification and associated ductal dilatation.  Genetic testing negative  Plan: - H/O Colon Cancer and Prostate Cancer on observation - Continue to monitor CEA/PSA - Continue follow up with urology for the management of prostate cancer. PSA has been undetectable - CT CAP 2/5/24 neg for malignancy. Chronic pancreatitis-forwarded to GI..Annual imaging. Will reorder annual CT Chest, CT Abd/pelvis for 2/25 - follow up with GI for surveillance colonoscopies (colonoscopy 6/21/23) - discussed that if abd discomfort continues he should follow up with GI - Continue routine, age-appropriate, healthcare maintenance - Office visit in 4 months with repeat scans in 2/2025.

## 2024-07-19 NOTE — PHYSICAL EXAM
[Restricted in physically strenuous activity but ambulatory and able to carry out work of a light or sedentary nature] : Status 1- Restricted in physically strenuous activity but ambulatory and able to carry out work of a light or sedentary nature, e.g., light house work, office work [Normal] : affect appropriate [de-identified] : midline scar. Well healing. some scabs [de-identified] : supra umbilical scar

## 2024-07-22 ENCOUNTER — LABORATORY RESULT (OUTPATIENT)
Age: 74
End: 2024-07-22

## 2024-07-22 ENCOUNTER — APPOINTMENT (OUTPATIENT)
Dept: HEMATOLOGY ONCOLOGY | Facility: CLINIC | Age: 74
End: 2024-07-22
Payer: MEDICARE

## 2024-07-22 ENCOUNTER — RESULT REVIEW (OUTPATIENT)
Age: 74
End: 2024-07-22

## 2024-07-22 VITALS
WEIGHT: 173 LBS | SYSTOLIC BLOOD PRESSURE: 139 MMHG | DIASTOLIC BLOOD PRESSURE: 86 MMHG | RESPIRATION RATE: 16 BRPM | BODY MASS INDEX: 23.43 KG/M2 | OXYGEN SATURATION: 98 % | HEIGHT: 72 IN | HEART RATE: 76 BPM | TEMPERATURE: 97.9 F

## 2024-07-22 DIAGNOSIS — C61 MALIGNANT NEOPLASM OF PROSTATE: ICD-10-CM

## 2024-07-22 DIAGNOSIS — C18.7 MALIGNANT NEOPLASM OF SIGMOID COLON: ICD-10-CM

## 2024-07-22 PROCEDURE — G2211 COMPLEX E/M VISIT ADD ON: CPT

## 2024-07-22 PROCEDURE — 99214 OFFICE O/P EST MOD 30 MIN: CPT

## 2024-08-01 ENCOUNTER — APPOINTMENT (OUTPATIENT)
Dept: PAIN MANAGEMENT | Facility: CLINIC | Age: 74
End: 2024-08-01
Payer: MEDICARE

## 2024-08-01 DIAGNOSIS — M54.16 RADICULOPATHY, LUMBAR REGION: ICD-10-CM

## 2024-08-01 DIAGNOSIS — M79.18 MYALGIA, OTHER SITE: ICD-10-CM

## 2024-08-01 DIAGNOSIS — M47.817 SPONDYLOSIS W/OUT MYELOPATHY OR RADICULOPATHY, LUMBOSACRAL REGION: ICD-10-CM

## 2024-08-01 DIAGNOSIS — M79.2 NEURALGIA AND NEURITIS, UNSPECIFIED: ICD-10-CM

## 2024-08-01 DIAGNOSIS — G89.4 CHRONIC PAIN SYNDROME: ICD-10-CM

## 2024-08-01 PROCEDURE — 99214 OFFICE O/P EST MOD 30 MIN: CPT

## 2024-08-01 PROCEDURE — G2211 COMPLEX E/M VISIT ADD ON: CPT

## 2024-08-01 NOTE — ASSESSMENT
[FreeTextEntry1] : >> Imaging and Other Studies   I personally reviewed the relevant imaging.  Discussed and explained to patient the likely source of pathology and pain.  Questions answered. XR MRI   >> Therapy and Other Modalities  PT  >> Medications  weaned from fentanyl   gabapentin TID cautioned change in mood.  Encouraged to call with any worsening mood or depression/suicidal ideations  flexeril 10mg prn spasm/pain  trial lidocaine 4% patch q12h on q12h off for rib pain   >> Interventions   Significant component of back and leg pain likely secondary to lumbar spinal stenosis demonstrated on MRI LS.  sp L4-5 interlaminar epidural steroid injection with significant improvement x 2  given efficacy of previous intervention that is >80% improvement in pain and improved ability to perform adls, and return of pain despite conservative treatment, will schedule repeat L4-5 interlaminar epidural steroid injection r/b/a discussed  Significant component of axial back pain secondary to lumbar spondylosis and facet arthropathy demonstrated on MRI LS.  Pain refractory to conservative treatments.  sp BILATERAL L4-sacral ala diagnostic medial branch block (2 joints, 3 nerves on each side)  without significant improvement    >> Consults   >> Discussion of Risks/Benefits/Alternatives    >Regarding any scheduled procedures:  In the event the patient is scheduled for a procedure,   I have discussed in detail with the patient that any interventional pain procedure is associated with potential risks.  The procedure may include an injection of steroids and potentially other medications (local anesthetic and normal saline) into the epidural space or surrounding tissue of the spine.  There are significant risks of this procedure which include and are not limited to infection, bleeding, worsening pain, dural puncture leading to postdural puncture headache, nerve damage, spinal cord injury, paralysis, stroke, and death.    There is a chance that the procedure does not improve their pain.    There are risks associated with the steroid being absorbed into the body systemically.  These include dysphoria, difficulty sleeping, mood swings and personality changes.  Premenopausal women may notice an irregularity in her menstrual cycle for 2-3 months following the injection.  Steroids can specifically affect patients with hypertension, diabetes, and peptic ulcers.  The procedure may cause a temporary increase in blood pressure and blood pressure, and may adversely affect a peptic ulcer.  Other, more rare complications, include avascular necrosis of joints, glaucoma and worsening of osteoporosis.   I have discussed the risks of the procedure at length with the patient, and the potential benefits of pain relief.  I have offered alternatives to the procedure.  All questions were answered.    The patient expressed understanding and wishes to proceed with the procedure.    > Longitudinal management of Complex Painful condition   The patient is being managed for a complex condition that requires ongoing management.  The nature of this condition demands nuanced approach to treatment.  The seriousness of the condition necessitates an in-depth and focused approach to management and coordination with other healthcare professionals.    This visit involves intricate evaluation and management of the patient's condition.  The complexity of the visit was due to the need for detailed assessment of the current state, consideration of potential complications and a careful balancing of treatment options to management the chronic condition effectively.   As detailed above, the patient has a chronic significant painful condition that requires regular and detailed management.  The condition's impact on the patient's quality of life and health is substantial and necessitates a comprehensive and tailored approach   >> Conclusion   There were no barriers to communication. Informed patient that I would be available for any additional questions. Patient was instructed to call with any worsening symptoms including severe pain, new numbness/weakness, or changes in the bowel/bladder function. Discussed role of nsaids in pain management and all relevant risks, if patient is continuing to require after 4 weeks the patient should f/u for alternative treatment. Instructed patient to maintain pain diary to monitor pain level, mobility, and function.  I explained to patient benefits and limitation of TeleMedicine visits  Patient understands that limitations include inability to perform comprehensive physical exam, which may lead to potential diagnostic inconsistencies.    Any scheduled procedures are based on history, imaging and limited physical exam performed on TeleHealth visit.  If necessary, additional focal physical exam will be performed on date of procedure  Patient understands that diagnosis and treatment may be limited by these inconsistencies and patient agrees to proceed with care plan

## 2024-08-01 NOTE — HISTORY OF PRESENT ILLNESS
[Back Pain] : back pain [___ yrs] : [unfilled] year(s) ago [Constant] : constant [7] : an average pain level of 7/10 [6] : a minimum pain level of 6/10 [10] : a maximum pain level of 10/10 [Throbbing] : throbbing [Shooting] : shooting [Walking] : walking [Bending] : bending [Medications] : medications [Home] : at home, [unfilled] , at the time of the visit. [Medical Office: ([facility])___] : at the medical office located in  [Verbal consent obtained from patient] : the patient, [unfilled] [FreeTextEntry1] : Interval Note:  sp L4-5 interlaminar epidural steroid injection 7/17/24 with significant improvement in back and leg pain.  Patient reports that he is coughing because of allergies and having some rib pain.   Since last visit the pain is improved. Denies any additional weakness, numbness, bowel/bladder dysfunction.     HPI     Mr. JEROMY JAMES is a 74 year M sp CABG on baby ASA but not on plavix with pmhx of prostate CA (s/p prostatectomy 9/22), colon CA (s/p resection 1/22), hx chronic pancreatitis, HTN, CAD with stent x 2 (1998, 2008). C/o axial lower back pain that radiates to left thigh and right buttock. Maintained on opiates and NSAIDs per Dr Davis for 20 yrs. Reports the [place] practice is closing and is seeking a new pain physician. Denies any additional weakness, numbness, bowel/bladder dysfunction, history of bleeding disorders.    Previous and current pain medications/doses/effects: Fentanyl patch 50mg, Hydrocodone 5/325 prn, meloxicam    Previous Pain Treatments:      Previous Pain Injections:  BILATERAL L4-sacral ala diagnostic medial branch block (2 joints, 3 nerves on each side) without any relief in axial back pain 7/9/24   L4-5 interlaminar epidural steroid injection 5/15/24  L4-5 interlaminar epidural steroid injection 1/31/24  Previous Diagnostic Studies/Images:  MRI LS 1/24  The patient is status post prior right L5 princess-laminectomy.   There is dextroscoliosis of the lumbar spine. There is mild anterolisthesis of L2 on L3. There is trace anterolisthesis of L4 on L5. There is mild right lateral subluxation of L3 on L4 and L4 on L5.  There is a moderate chronic compression fracture of the T12 vertebral body. There is a mild to moderate chronic compression fracture of the L4 vertebral body. There is mild retropulsion of the posterior aspect of the superior endplate of T12 that is flattening the ventral thecal sac, chronic. There are small Schmorl's nodes seen at the superior and inferior endplates of L4 vertebral body. The remaining vertebral bodies are normal height. There is severe loss of disc height at the L5/S1 disc space with anterior posterior osteophyte formation and Modic 2 and 3 adjacent endplate changes consistent with desiccation and degenerative disease. There is moderate loss of disc height and T2 signal at the L2/L3, L3/L4 and L4/L5 disc spaces with mild adjacent Modic 2 endplate changes and small anterior minimal posterior osteophyte formation consistent with desiccation and degenerative disease. There is vacuum phenomenon seen within the L2/L3, L3/L4, L4/L5, L5/S1 disc spaces consistent with degenerative changes. The L1/L2 disc spaces demonstrates desiccation. The conus terminates at the L1 level and demonstrates no evidence of abnormal signal changes.   Evaluation of the individual levels demonstrates at the L5/S1 level there is a diffuse disc bulge flattening the ventral thecal sac compressing the right contacting the left L5 foraminal exiting nerve roots. There is severe right and moderate to severe left foraminal narrowing. There is moderate facet degenerative changes, right greater than left. There has been prior decompression.   At the L4/L5 level there is minimal unroofing of the posterior aspect the disc space due to the anterolisthesis. There is a diffuse disc bulge and osteophyte flattening the ventral thecal sac and in close proximity to the distal foraminal/far lateral L4 exiting nerve root. The right L4 foraminal exiting nerve root is within normal limits. There is mild to moderate right and moderate left foraminal narrowing. There is moderate to severe bilateral facet and ligamentous hypertrophy. There is a tiny amount of fluid seen within the facet joints. The constellation of findings is causing mild spinal canal stenosis.   At the L3/L4 level there is a diffuse disc bulge with an asymmetric left foraminal and far lateral broad-based disc protrusion flattening the ventral thecal sac and compressing the distal foraminal/far lateral left L3 exiting nerve root as well as contacting the left descending L4 nerve root. There is moderate right and severe left foraminal narrowing. There is moderate to severe facet and ligamentous hypertrophy. There is narrowing of the left lateral recess. The constellation of findings is causing moderate spinal canal stenosis.   At the L2/L3 level there is unroofing of the posterior aspect the disc space due to the mild anterolisthesis. There is a superimposed diffuse disc bulge and osteophyte flattening the ventral thecal sac. There is mild bilateral foraminal narrowing. There is severe facet and ligamentous hypertrophy. The constellation of findings is causing mild spinal canal stenosis.   At the L1/L2 level there is a mild diffuse disc bulge flattening the ventral thecal sac. There is moderate facet and ligamentous hypertrophy. There is mild bilateral foraminal narrowing. There is no evidence of spinal canal stenosis.   There is a small 1 cm cyst seen within the right kidney.   Impression:   Status post right L5 hemilaminectomy.   Dextroscoliosis of the lumbar spine.   Mild anterolisthesis of L2 on L3.   Trace anterolisthesis of L4 on L5.   Mild right lateral subluxation of L3 on L4 and L4 on L5.    L5/S1  diffuse disc bulge  compressing the right contacting the left L5 foraminal exiting nerve roots.  There has been prior decompression.   L4/L5 diffuse disc bulge and osteophyte flattening the ventral thecal sac and in close proximity to the distal foraminal/far lateral L4 exiting nerve root. L4/L5 mild spinal canal stenosis.   L3/L4  diffuse disc bulge with an asymmetric left foraminal and far lateral broad-based disc protrusion  compressing the distal foraminal/far lateral left L3 exiting nerve root as well as contacting the left descending L4 nerve root. L3/L4 moderate spinal canal stenosis.   L2/L3  diffuse disc bulge and osteophyte. L2/L3 mild spinal canal stenosis.   L1/L2  mild diffuse disc bulge flattening the ventral thecal sac. There is moderate facet and ligamentous hypertrophy. There is mild bilateral foraminal narrowing. No evidence of spinal canal stenosis.   Moderate chronic compression fracture of the T12 vertebral body. Mild to moderate chronic compression fracture of the L4 vertebral body   Multilevel degenerative changes of the lumbar spine. 05/13/2021 Exam: MRI of the Lumbar Spine  CLINICAL INFORMATION: Severe low back pain.  TECHNIQUE: Sagittal T1 and T2 as well as axial T2 weighted images of the lumbar spine are obtained without the use of intravenous contrast.  COMPARISON: Prior examination 06/16/2020.  FINDINGS: There is again now an old superior endplate fracture of T12. There is slight spondylolisthesis at C2-C3. There is again severe desiccation loss of heignt of all the intervertebral discs in the lumbar spine sparing L1-L2. There is again moderate-to-severe osteophyte disc complex at L2-L3 with mild-to-moderate ligamentous and moderate facet hypertrophy yielding moderate-to-severe right neural foraminal stenosis and mild-to-moderate canal stenosis. No significant left-sided neural foraminal stenosis is noted.  Again seen is a moderate osteophyte disc complex at L3-L4 with mild-to- moderate ligamentous and severe facet hypertrophy yielding severe spinal canal and severe bilateral neural foraminal stenosis.  There is again osteophyte disc complex at L4-L5 with mild ligamentous and moderate-to-severe facet hypertrophy yielding severe bilatera neural foraminal stenosis  - and moderate canal stenosis. There is facet edema which could be a further source of pain.  Again seen is moderate osteophyte disc complex at L5-S1. There is moderate-to-severe facet hypertrophy yielding severe bilateral neural foraminal stenosis but no significant canal stenosis. There is severe bilateral lateral recess stenos 3.  IMPRESSION: Multilevel degenerative disc disease.   ====================================================================================== 6/16/20  Exam: MRI of the Thoracic Spine  HISTORY: Severe back pain after lifting a grill.  FINDINGS: Examination is a MRI of the thoracic spine without contrast enhancement.  There is a subacute compression to the superior endplate of T12. There is an older mild compression to T6. The T12 compression does not demonstrate any involvement of the posterior elements, and there is approximately 10% loss of height to the T12 vertebral body. There are no signs of any epidural hematomas or any signs of any intramedullary lesions or cord compressions. No evidence for any disc herniations or any other significant findings otherwise identified.  IMPRESSION:  Subacute compression to T12 with loss of height of about 10% to the superior endplate.   ====================================================================================== 8/31/20 Bone Density  IMPRESSION: Normal bone density lumbar spine.  Risk for lumbar spine fracture is not increased. Osteopenia left hip. Risk for hip fracture is mildly increased.     [FreeTextEntry7] : Lower back pain

## 2024-08-01 NOTE — PHYSICAL EXAM
[de-identified] :  Constitutional: Normal, well developed, no acute distress on audio/video examination Eyes: Symmetric, External structures on video examination ENT: Lips, mucosa and tongue normal on video examination Oropharynx: Lips normal, symmetric, no external lesions appreciated appreciated on video examination Respiratory: Non-labored breathing, no audible wheezes appreciated on audio/video examination Vascular: No cyanosis appreciated or edema appreciated on video examination GI:  no jaundice appreciated on video examination Neurovascular: CN grossly intact on video/audio examination, alert MSK: Normal muscle bulk on video examination

## 2024-08-22 ENCOUNTER — RESULT REVIEW (OUTPATIENT)
Age: 74
End: 2024-08-22

## 2024-09-03 ENCOUNTER — APPOINTMENT (OUTPATIENT)
Dept: PAIN MANAGEMENT | Facility: CLINIC | Age: 74
End: 2024-09-03
Payer: MEDICARE

## 2024-09-03 VITALS
BODY MASS INDEX: 23.43 KG/M2 | HEIGHT: 72 IN | HEART RATE: 79 BPM | OXYGEN SATURATION: 97 % | DIASTOLIC BLOOD PRESSURE: 88 MMHG | SYSTOLIC BLOOD PRESSURE: 135 MMHG | WEIGHT: 173 LBS

## 2024-09-03 DIAGNOSIS — G89.4 CHRONIC PAIN SYNDROME: ICD-10-CM

## 2024-09-03 DIAGNOSIS — M79.18 MYALGIA, OTHER SITE: ICD-10-CM

## 2024-09-03 DIAGNOSIS — M47.817 SPONDYLOSIS W/OUT MYELOPATHY OR RADICULOPATHY, LUMBOSACRAL REGION: ICD-10-CM

## 2024-09-03 DIAGNOSIS — M54.16 RADICULOPATHY, LUMBAR REGION: ICD-10-CM

## 2024-09-03 DIAGNOSIS — M96.1 POSTLAMINECTOMY SYNDROME, NOT ELSEWHERE CLASSIFIED: ICD-10-CM

## 2024-09-03 DIAGNOSIS — M79.2 NEURALGIA AND NEURITIS, UNSPECIFIED: ICD-10-CM

## 2024-09-03 PROCEDURE — G2211 COMPLEX E/M VISIT ADD ON: CPT

## 2024-09-03 PROCEDURE — 99214 OFFICE O/P EST MOD 30 MIN: CPT

## 2024-09-03 RX ORDER — CHLORHEXIDINE GLUCONATE 213 G/1000ML
4 SOLUTION TOPICAL
Qty: 1 | Refills: 0 | Status: ACTIVE | COMMUNITY
Start: 2024-09-03 | End: 1900-01-01

## 2024-09-03 NOTE — HISTORY OF PRESENT ILLNESS
[Back Pain] : back pain [___ yrs] : [unfilled] year(s) ago [Constant] : constant [7] : an average pain level of 7/10 [6] : a minimum pain level of 6/10 [10] : a maximum pain level of 10/10 [Throbbing] : throbbing [Shooting] : shooting [Walking] : walking [Bending] : bending [Medications] : medications [FreeTextEntry1] : Interval Note:  Patient reports that back pain has returned.  Pain is so bad that patient finds it difficult to perform adls and ambulate. Pain over right back and buttock. He asked if there are any devices that may be helpful in improving his overall pain.  Since last visit the pain is improved. Denies any additional weakness, numbness, bowel/bladder dysfunction.     HPI     Mr. JEROMY JAMES is a 74 year M sp CABG on baby ASA but not on plavix with pmhx of prostate CA (s/p prostatectomy 9/22), colon CA (s/p resection 1/22), hx chronic pancreatitis, HTN, CAD with stent x 2 (1998, 2008). C/o axial lower back pain that radiates to left thigh and right buttock. Maintained on opiates and NSAIDs per Dr Davis for 20 yrs. Reports the [place] practice is closing and is seeking a new pain physician. Denies any additional weakness, numbness, bowel/bladder dysfunction, history of bleeding disorders.    Previous and current pain medications/doses/effects: Fentanyl patch 50mg, Hydrocodone 5/325 prn, meloxicam    Previous Pain Treatments:      Previous Pain Injections:  BILATERAL L4-sacral ala diagnostic medial branch block (2 joints, 3 nerves on each side) without any relief in axial back pain 7/9/24   L4-5 interlaminar epidural steroid injection 5/15/24  L4-5 interlaminar epidural steroid injection 1/31/24  Previous Diagnostic Studies/Images:  MRI LS 1/24  The patient is status post prior right L5 princess-laminectomy.   There is dextroscoliosis of the lumbar spine. There is mild anterolisthesis of L2 on L3. There is trace anterolisthesis of L4 on L5. There is mild right lateral subluxation of L3 on L4 and L4 on L5.  There is a moderate chronic compression fracture of the T12 vertebral body. There is a mild to moderate chronic compression fracture of the L4 vertebral body. There is mild retropulsion of the posterior aspect of the superior endplate of T12 that is flattening the ventral thecal sac, chronic. There are small Schmorl's nodes seen at the superior and inferior endplates of L4 vertebral body. The remaining vertebral bodies are normal height. There is severe loss of disc height at the L5/S1 disc space with anterior posterior osteophyte formation and Modic 2 and 3 adjacent endplate changes consistent with desiccation and degenerative disease. There is moderate loss of disc height and T2 signal at the L2/L3, L3/L4 and L4/L5 disc spaces with mild adjacent Modic 2 endplate changes and small anterior minimal posterior osteophyte formation consistent with desiccation and degenerative disease. There is vacuum phenomenon seen within the L2/L3, L3/L4, L4/L5, L5/S1 disc spaces consistent with degenerative changes. The L1/L2 disc spaces demonstrates desiccation. The conus terminates at the L1 level and demonstrates no evidence of abnormal signal changes.   Evaluation of the individual levels demonstrates at the L5/S1 level there is a diffuse disc bulge flattening the ventral thecal sac compressing the right contacting the left L5 foraminal exiting nerve roots. There is severe right and moderate to severe left foraminal narrowing. There is moderate facet degenerative changes, right greater than left. There has been prior decompression.   At the L4/L5 level there is minimal unroofing of the posterior aspect the disc space due to the anterolisthesis. There is a diffuse disc bulge and osteophyte flattening the ventral thecal sac and in close proximity to the distal foraminal/far lateral L4 exiting nerve root. The right L4 foraminal exiting nerve root is within normal limits. There is mild to moderate right and moderate left foraminal narrowing. There is moderate to severe bilateral facet and ligamentous hypertrophy. There is a tiny amount of fluid seen within the facet joints. The constellation of findings is causing mild spinal canal stenosis.   At the L3/L4 level there is a diffuse disc bulge with an asymmetric left foraminal and far lateral broad-based disc protrusion flattening the ventral thecal sac and compressing the distal foraminal/far lateral left L3 exiting nerve root as well as contacting the left descending L4 nerve root. There is moderate right and severe left foraminal narrowing. There is moderate to severe facet and ligamentous hypertrophy. There is narrowing of the left lateral recess. The constellation of findings is causing moderate spinal canal stenosis.   At the L2/L3 level there is unroofing of the posterior aspect the disc space due to the mild anterolisthesis. There is a superimposed diffuse disc bulge and osteophyte flattening the ventral thecal sac. There is mild bilateral foraminal narrowing. There is severe facet and ligamentous hypertrophy. The constellation of findings is causing mild spinal canal stenosis.   At the L1/L2 level there is a mild diffuse disc bulge flattening the ventral thecal sac. There is moderate facet and ligamentous hypertrophy. There is mild bilateral foraminal narrowing. There is no evidence of spinal canal stenosis.   There is a small 1 cm cyst seen within the right kidney.   Impression:   Status post right L5 hemilaminectomy.   Dextroscoliosis of the lumbar spine.   Mild anterolisthesis of L2 on L3.   Trace anterolisthesis of L4 on L5.   Mild right lateral subluxation of L3 on L4 and L4 on L5.    L5/S1  diffuse disc bulge  compressing the right contacting the left L5 foraminal exiting nerve roots.  There has been prior decompression.   L4/L5 diffuse disc bulge and osteophyte flattening the ventral thecal sac and in close proximity to the distal foraminal/far lateral L4 exiting nerve root. L4/L5 mild spinal canal stenosis.   L3/L4  diffuse disc bulge with an asymmetric left foraminal and far lateral broad-based disc protrusion  compressing the distal foraminal/far lateral left L3 exiting nerve root as well as contacting the left descending L4 nerve root. L3/L4 moderate spinal canal stenosis.   L2/L3  diffuse disc bulge and osteophyte. L2/L3 mild spinal canal stenosis.   L1/L2  mild diffuse disc bulge flattening the ventral thecal sac. There is moderate facet and ligamentous hypertrophy. There is mild bilateral foraminal narrowing. No evidence of spinal canal stenosis.   Moderate chronic compression fracture of the T12 vertebral body. Mild to moderate chronic compression fracture of the L4 vertebral body   Multilevel degenerative changes of the lumbar spine. 05/13/2021 Exam: MRI of the Lumbar Spine  CLINICAL INFORMATION: Severe low back pain.  TECHNIQUE: Sagittal T1 and T2 as well as axial T2 weighted images of the lumbar spine are obtained without the use of intravenous contrast.  COMPARISON: Prior examination 06/16/2020.  FINDINGS: There is again now an old superior endplate fracture of T12. There is slight spondylolisthesis at C2-C3. There is again severe desiccation loss of heignt of all the intervertebral discs in the lumbar spine sparing L1-L2. There is again moderate-to-severe osteophyte disc complex at L2-L3 with mild-to-moderate ligamentous and moderate facet hypertrophy yielding moderate-to-severe right neural foraminal stenosis and mild-to-moderate canal stenosis. No significant left-sided neural foraminal stenosis is noted.  Again seen is a moderate osteophyte disc complex at L3-L4 with mild-to- moderate ligamentous and severe facet hypertrophy yielding severe spinal canal and severe bilateral neural foraminal stenosis.  There is again osteophyte disc complex at L4-L5 with mild ligamentous and moderate-to-severe facet hypertrophy yielding severe bilatera neural foraminal stenosis  - and moderate canal stenosis. There is facet edema which could be a further source of pain.  Again seen is moderate osteophyte disc complex at L5-S1. There is moderate-to-severe facet hypertrophy yielding severe bilateral neural foraminal stenosis but no significant canal stenosis. There is severe bilateral lateral recess stenos 3.  IMPRESSION: Multilevel degenerative disc disease.   ====================================================================================== 6/16/20  Exam: MRI of the Thoracic Spine  HISTORY: Severe back pain after lifting a grill.  FINDINGS: Examination is a MRI of the thoracic spine without contrast enhancement.  There is a subacute compression to the superior endplate of T12. There is an older mild compression to T6. The T12 compression does not demonstrate any involvement of the posterior elements, and there is approximately 10% loss of height to the T12 vertebral body. There are no signs of any epidural hematomas or any signs of any intramedullary lesions or cord compressions. No evidence for any disc herniations or any other significant findings otherwise identified.  IMPRESSION:  Subacute compression to T12 with loss of height of about 10% to the superior endplate.   ====================================================================================== 8/31/20 Bone Density  IMPRESSION: Normal bone density lumbar spine.  Risk for lumbar spine fracture is not increased. Osteopenia left hip. Risk for hip fracture is mildly increased.     [FreeTextEntry7] : Lower back pain

## 2024-09-03 NOTE — ASSESSMENT
[FreeTextEntry1] : >> Imaging and Other Studies   I personally reviewed the relevant imaging.  Discussed and explained to patient the likely source of pathology and pain.  Questions answered. XR MRI   >> Therapy and Other Modalities  PT  >> Medications  weaned from fentanyl   gabapentin TID cautioned change in mood.  Encouraged to call with any worsening mood or depression/suicidal ideations  flexeril 10mg prn spasm/pain  trial lidocaine 4% patch q12h on q12h off for rib pain   >> Interventions   Significant component of back and leg pain likely secondary to lumbar spinal stenosis demonstrated on MRI LS.  sp L4-5 interlaminar epidural steroid injection with significant improvement   neuropathic pain persists despite conservative treatments and interventions Will schedule spinal cord stimulator trial r/b/a discussed  Referral to Psych for evaluation  MRI TS to evaluate for epidural patency  rtc pre trial for information session with myself and rep    Significant component of axial back pain secondary to lumbar spondylosis and facet arthropathy demonstrated on MRI LS.  Pain refractory to conservative treatments.  sp BILATERAL L4-sacral ala diagnostic medial branch block (2 joints, 3 nerves on each side)  without significant improvement    >> Consults   >> Discussion of Risks/Benefits/Alternatives    >Regarding any scheduled procedures:  In the event the patient is scheduled for a procedure,   I have discussed in detail with the patient that any interventional pain procedure is associated with potential risks.  The procedure may include an injection of steroids and potentially other medications (local anesthetic and normal saline) into the epidural space or surrounding tissue of the spine.  There are significant risks of this procedure which include and are not limited to infection, bleeding, worsening pain, dural puncture leading to postdural puncture headache, nerve damage, spinal cord injury, paralysis, stroke, and death.    There is a chance that the procedure does not improve their pain.    There are risks associated with the steroid being absorbed into the body systemically.  These include dysphoria, difficulty sleeping, mood swings and personality changes.  Premenopausal women may notice an irregularity in her menstrual cycle for 2-3 months following the injection.  Steroids can specifically affect patients with hypertension, diabetes, and peptic ulcers.  The procedure may cause a temporary increase in blood pressure and blood pressure, and may adversely affect a peptic ulcer.  Other, more rare complications, include avascular necrosis of joints, glaucoma and worsening of osteoporosis.   I have discussed the risks of the procedure at length with the patient, and the potential benefits of pain relief.  I have offered alternatives to the procedure.  All questions were answered.    The patient expressed understanding and wishes to proceed with the procedure.    > Longitudinal management of Complex Painful condition   The patient is being managed for a complex condition that requires ongoing management.  The nature of this condition demands nuanced approach to treatment.  The seriousness of the condition necessitates an in-depth and focused approach to management and coordination with other healthcare professionals.    This visit involves intricate evaluation and management of the patient's condition.  The complexity of the visit was due to the need for detailed assessment of the current state, consideration of potential complications and a careful balancing of treatment options to management the chronic condition effectively.   As detailed above, the patient has a chronic significant painful condition that requires regular and detailed management.  The condition's impact on the patient's quality of life and health is substantial and necessitates a comprehensive and tailored approach   >> Conclusion   There were no barriers to communication. Informed patient that I would be available for any additional questions. Patient was instructed to call with any worsening symptoms including severe pain, new numbness/weakness, or changes in the bowel/bladder function. Discussed role of nsaids in pain management and all relevant risks, if patient is continuing to require after 4 weeks the patient should f/u for alternative treatment. Instructed patient to maintain pain diary to monitor pain level, mobility, and function.

## 2024-09-04 ENCOUNTER — RESULT REVIEW (OUTPATIENT)
Age: 74
End: 2024-09-04

## 2024-09-17 ENCOUNTER — NON-APPOINTMENT (OUTPATIENT)
Age: 74
End: 2024-09-17

## 2024-09-20 ENCOUNTER — APPOINTMENT (OUTPATIENT)
Dept: PAIN MANAGEMENT | Facility: CLINIC | Age: 74
End: 2024-09-20
Payer: MEDICARE

## 2024-09-20 VITALS
HEIGHT: 72 IN | SYSTOLIC BLOOD PRESSURE: 119 MMHG | DIASTOLIC BLOOD PRESSURE: 83 MMHG | BODY MASS INDEX: 23.43 KG/M2 | WEIGHT: 173 LBS

## 2024-09-20 DIAGNOSIS — M79.2 NEURALGIA AND NEURITIS, UNSPECIFIED: ICD-10-CM

## 2024-09-20 DIAGNOSIS — M96.1 POSTLAMINECTOMY SYNDROME, NOT ELSEWHERE CLASSIFIED: ICD-10-CM

## 2024-09-20 DIAGNOSIS — M54.16 RADICULOPATHY, LUMBAR REGION: ICD-10-CM

## 2024-09-20 DIAGNOSIS — G89.4 CHRONIC PAIN SYNDROME: ICD-10-CM

## 2024-09-20 DIAGNOSIS — M79.18 MYALGIA, OTHER SITE: ICD-10-CM

## 2024-09-20 DIAGNOSIS — M47.817 SPONDYLOSIS W/OUT MYELOPATHY OR RADICULOPATHY, LUMBOSACRAL REGION: ICD-10-CM

## 2024-09-20 PROCEDURE — G2211 COMPLEX E/M VISIT ADD ON: CPT

## 2024-09-20 PROCEDURE — 99214 OFFICE O/P EST MOD 30 MIN: CPT

## 2024-09-20 RX ORDER — CHLORHEXIDINE GLUCONATE 213 G/1000ML
4 SOLUTION TOPICAL
Qty: 1 | Refills: 0 | Status: ACTIVE | COMMUNITY
Start: 2024-09-20 | End: 1900-01-01

## 2024-09-20 NOTE — HISTORY OF PRESENT ILLNESS
[Back Pain] : back pain [___ yrs] : [unfilled] year(s) ago [Constant] : constant [7] : an average pain level of 7/10 [6] : a minimum pain level of 6/10 [10] : a maximum pain level of 10/10 [Throbbing] : throbbing [Shooting] : shooting [Walking] : walking [Bending] : bending [Medications] : medications [FreeTextEntry1] : Interval Note:  Patient reports that back pain has returned.  Pain is so bad that patient finds it difficult to perform adls and ambulate. Patient is excited about the possibility of having scs trial  Since last visit the pain is improved. Denies any additional weakness, numbness, bowel/bladder dysfunction.     HPI     Mr. JEROMY JAMES is a 74 year M sp CABG on baby ASA but not on plavix with pmhx of prostate CA (s/p prostatectomy 9/22), colon CA (s/p resection 1/22), hx chronic pancreatitis, HTN, CAD with stent x 2 (1998, 2008). C/o axial lower back pain that radiates to left thigh and right buttock. Maintained on opiates and NSAIDs per Dr Davis for 20 yrs. Reports the [place] practice is closing and is seeking a new pain physician. Denies any additional weakness, numbness, bowel/bladder dysfunction, history of bleeding disorders.    Previous and current pain medications/doses/effects: Fentanyl patch 50mg, Hydrocodone 5/325 prn, meloxicam    Previous Pain Treatments:      Previous Pain Injections:  BILATERAL L4-sacral ala diagnostic medial branch block (2 joints, 3 nerves on each side) without any relief in axial back pain 7/9/24   L4-5 interlaminar epidural steroid injection 5/15/24  L4-5 interlaminar epidural steroid injection 1/31/24  Previous Diagnostic Studies/Images:  MRI TS 9/24    There is exaggerated thoracic kyphosis. There is mild compression at the T6 and moderate compression of the T12 vertebral bodies which is stable from prior exam. There is no bone marrow edema. There is a Schmorl's node in the superior endplate of T8 and inferior plate of T7. The vertebral body heights are otherwise maintained. There is no cord signal abnormality. The prevertebral and paravertebral soft tissues are unremarkable.   At the T6-T7 level there is a right paracentral disc herniation without significant spinal canal stenosis. At the T7-T8 level there is a right subarticular disc herniation without significant spinal canal stenosis.   IMPRESSION:   Widely patent thoracic spinal canal. No cord compression or cord signal abnormality.   Multiple chronic compression fractures stable from prior CT chest 6/20/2024. MRI LS 1/24  The patient is status post prior right L5 princess-laminectomy.   There is dextroscoliosis of the lumbar spine. There is mild anterolisthesis of L2 on L3. There is trace anterolisthesis of L4 on L5. There is mild right lateral subluxation of L3 on L4 and L4 on L5.  There is a moderate chronic compression fracture of the T12 vertebral body. There is a mild to moderate chronic compression fracture of the L4 vertebral body. There is mild retropulsion of the posterior aspect of the superior endplate of T12 that is flattening the ventral thecal sac, chronic. There are small Schmorl's nodes seen at the superior and inferior endplates of L4 vertebral body. The remaining vertebral bodies are normal height. There is severe loss of disc height at the L5/S1 disc space with anterior posterior osteophyte formation and Modic 2 and 3 adjacent endplate changes consistent with desiccation and degenerative disease. There is moderate loss of disc height and T2 signal at the L2/L3, L3/L4 and L4/L5 disc spaces with mild adjacent Modic 2 endplate changes and small anterior minimal posterior osteophyte formation consistent with desiccation and degenerative disease. There is vacuum phenomenon seen within the L2/L3, L3/L4, L4/L5, L5/S1 disc spaces consistent with degenerative changes. The L1/L2 disc spaces demonstrates desiccation. The conus terminates at the L1 level and demonstrates no evidence of abnormal signal changes.   Evaluation of the individual levels demonstrates at the L5/S1 level there is a diffuse disc bulge flattening the ventral thecal sac compressing the right contacting the left L5 foraminal exiting nerve roots. There is severe right and moderate to severe left foraminal narrowing. There is moderate facet degenerative changes, right greater than left. There has been prior decompression.   At the L4/L5 level there is minimal unroofing of the posterior aspect the disc space due to the anterolisthesis. There is a diffuse disc bulge and osteophyte flattening the ventral thecal sac and in close proximity to the distal foraminal/far lateral L4 exiting nerve root. The right L4 foraminal exiting nerve root is within normal limits. There is mild to moderate right and moderate left foraminal narrowing. There is moderate to severe bilateral facet and ligamentous hypertrophy. There is a tiny amount of fluid seen within the facet joints. The constellation of findings is causing mild spinal canal stenosis.   At the L3/L4 level there is a diffuse disc bulge with an asymmetric left foraminal and far lateral broad-based disc protrusion flattening the ventral thecal sac and compressing the distal foraminal/far lateral left L3 exiting nerve root as well as contacting the left descending L4 nerve root. There is moderate right and severe left foraminal narrowing. There is moderate to severe facet and ligamentous hypertrophy. There is narrowing of the left lateral recess. The constellation of findings is causing moderate spinal canal stenosis.   At the L2/L3 level there is unroofing of the posterior aspect the disc space due to the mild anterolisthesis. There is a superimposed diffuse disc bulge and osteophyte flattening the ventral thecal sac. There is mild bilateral foraminal narrowing. There is severe facet and ligamentous hypertrophy. The constellation of findings is causing mild spinal canal stenosis.   At the L1/L2 level there is a mild diffuse disc bulge flattening the ventral thecal sac. There is moderate facet and ligamentous hypertrophy. There is mild bilateral foraminal narrowing. There is no evidence of spinal canal stenosis.   There is a small 1 cm cyst seen within the right kidney.   Impression:   Status post right L5 hemilaminectomy.   Dextroscoliosis of the lumbar spine.   Mild anterolisthesis of L2 on L3.   Trace anterolisthesis of L4 on L5.   Mild right lateral subluxation of L3 on L4 and L4 on L5.    L5/S1  diffuse disc bulge  compressing the right contacting the left L5 foraminal exiting nerve roots.  There has been prior decompression.   L4/L5 diffuse disc bulge and osteophyte flattening the ventral thecal sac and in close proximity to the distal foraminal/far lateral L4 exiting nerve root. L4/L5 mild spinal canal stenosis.   L3/L4  diffuse disc bulge with an asymmetric left foraminal and far lateral broad-based disc protrusion  compressing the distal foraminal/far lateral left L3 exiting nerve root as well as contacting the left descending L4 nerve root. L3/L4 moderate spinal canal stenosis.   L2/L3  diffuse disc bulge and osteophyte. L2/L3 mild spinal canal stenosis.   L1/L2  mild diffuse disc bulge flattening the ventral thecal sac. There is moderate facet and ligamentous hypertrophy. There is mild bilateral foraminal narrowing. No evidence of spinal canal stenosis.   Moderate chronic compression fracture of the T12 vertebral body. Mild to moderate chronic compression fracture of the L4 vertebral body   Multilevel degenerative changes of the lumbar spine. 05/13/2021 Exam: MRI of the Lumbar Spine  CLINICAL INFORMATION: Severe low back pain.  TECHNIQUE: Sagittal T1 and T2 as well as axial T2 weighted images of the lumbar spine are obtained without the use of intravenous contrast.  COMPARISON: Prior examination 06/16/2020.  FINDINGS: There is again now an old superior endplate fracture of T12. There is slight spondylolisthesis at C2-C3. There is again severe desiccation loss of heignt of all the intervertebral discs in the lumbar spine sparing L1-L2. There is again moderate-to-severe osteophyte disc complex at L2-L3 with mild-to-moderate ligamentous and moderate facet hypertrophy yielding moderate-to-severe right neural foraminal stenosis and mild-to-moderate canal stenosis. No significant left-sided neural foraminal stenosis is noted.  Again seen is a moderate osteophyte disc complex at L3-L4 with mild-to- moderate ligamentous and severe facet hypertrophy yielding severe spinal canal and severe bilateral neural foraminal stenosis.  There is again osteophyte disc complex at L4-L5 with mild ligamentous and moderate-to-severe facet hypertrophy yielding severe bilatera neural foraminal stenosis  - and moderate canal stenosis. There is facet edema which could be a further source of pain.  Again seen is moderate osteophyte disc complex at L5-S1. There is moderate-to-severe facet hypertrophy yielding severe bilateral neural foraminal stenosis but no significant canal stenosis. There is severe bilateral lateral recess stenos 3.  IMPRESSION: Multilevel degenerative disc disease.   ====================================================================================== 6/16/20  Exam: MRI of the Thoracic Spine  HISTORY: Severe back pain after lifting a grill.  FINDINGS: Examination is a MRI of the thoracic spine without contrast enhancement.  There is a subacute compression to the superior endplate of T12. There is an older mild compression to T6. The T12 compression does not demonstrate any involvement of the posterior elements, and there is approximately 10% loss of height to the T12 vertebral body. There are no signs of any epidural hematomas or any signs of any intramedullary lesions or cord compressions. No evidence for any disc herniations or any other significant findings otherwise identified.  IMPRESSION:  Subacute compression to T12 with loss of height of about 10% to the superior endplate.   ====================================================================================== 8/31/20 Bone Density  IMPRESSION: Normal bone density lumbar spine.  Risk for lumbar spine fracture is not increased. Osteopenia left hip. Risk for hip fracture is mildly increased.     [FreeTextEntry7] : Lower back pain

## 2024-10-22 ENCOUNTER — APPOINTMENT (OUTPATIENT)
Dept: HEMATOLOGY ONCOLOGY | Facility: CLINIC | Age: 74
End: 2024-10-22
Payer: MEDICARE

## 2024-10-22 DIAGNOSIS — K86.1 OTHER CHRONIC PANCREATITIS: ICD-10-CM

## 2024-10-22 DIAGNOSIS — C18.7 MALIGNANT NEOPLASM OF SIGMOID COLON: ICD-10-CM

## 2024-10-22 PROCEDURE — 99214 OFFICE O/P EST MOD 30 MIN: CPT

## 2024-10-22 PROCEDURE — G2211 COMPLEX E/M VISIT ADD ON: CPT

## 2024-11-15 ENCOUNTER — APPOINTMENT (OUTPATIENT)
Dept: PAIN MANAGEMENT | Facility: CLINIC | Age: 74
End: 2024-11-15

## 2024-11-15 DIAGNOSIS — M47.817 SPONDYLOSIS W/OUT MYELOPATHY OR RADICULOPATHY, LUMBOSACRAL REGION: ICD-10-CM

## 2024-11-15 DIAGNOSIS — M51.34 OTHER INTERVERTEBRAL DISC DEGENERATION, THORACIC REGION: ICD-10-CM

## 2024-11-15 DIAGNOSIS — M54.16 RADICULOPATHY, LUMBAR REGION: ICD-10-CM

## 2024-11-15 DIAGNOSIS — G89.4 CHRONIC PAIN SYNDROME: ICD-10-CM

## 2024-11-15 DIAGNOSIS — M79.18 MYALGIA, OTHER SITE: ICD-10-CM

## 2024-11-15 DIAGNOSIS — M79.2 NEURALGIA AND NEURITIS, UNSPECIFIED: ICD-10-CM

## 2024-11-15 DIAGNOSIS — M96.1 POSTLAMINECTOMY SYNDROME, NOT ELSEWHERE CLASSIFIED: ICD-10-CM

## 2024-11-15 PROCEDURE — G2211 COMPLEX E/M VISIT ADD ON: CPT

## 2024-11-15 PROCEDURE — 99214 OFFICE O/P EST MOD 30 MIN: CPT

## 2025-01-23 ENCOUNTER — APPOINTMENT (OUTPATIENT)
Dept: PAIN MANAGEMENT | Facility: CLINIC | Age: 75
End: 2025-01-23
Payer: MEDICARE

## 2025-01-23 DIAGNOSIS — G89.4 CHRONIC PAIN SYNDROME: ICD-10-CM

## 2025-01-23 DIAGNOSIS — M51.34 OTHER INTERVERTEBRAL DISC DEGENERATION, THORACIC REGION: ICD-10-CM

## 2025-01-23 DIAGNOSIS — M96.1 POSTLAMINECTOMY SYNDROME, NOT ELSEWHERE CLASSIFIED: ICD-10-CM

## 2025-01-23 DIAGNOSIS — M54.16 RADICULOPATHY, LUMBAR REGION: ICD-10-CM

## 2025-01-23 DIAGNOSIS — M47.817 SPONDYLOSIS W/OUT MYELOPATHY OR RADICULOPATHY, LUMBOSACRAL REGION: ICD-10-CM

## 2025-01-23 DIAGNOSIS — M79.2 NEURALGIA AND NEURITIS, UNSPECIFIED: ICD-10-CM

## 2025-01-23 DIAGNOSIS — M79.18 MYALGIA, OTHER SITE: ICD-10-CM

## 2025-01-23 PROCEDURE — G2211 COMPLEX E/M VISIT ADD ON: CPT | Mod: 2W

## 2025-01-23 PROCEDURE — 99214 OFFICE O/P EST MOD 30 MIN: CPT | Mod: 2W

## 2025-01-23 RX ORDER — METHYLPREDNISOLONE 4 MG/1
4 TABLET ORAL
Qty: 1 | Refills: 0 | Status: ACTIVE | COMMUNITY
Start: 2025-01-23 | End: 1900-01-01

## 2025-02-06 ENCOUNTER — APPOINTMENT (OUTPATIENT)
Dept: PAIN MANAGEMENT | Facility: CLINIC | Age: 75
End: 2025-02-06
Payer: MEDICARE

## 2025-02-06 DIAGNOSIS — M79.2 NEURALGIA AND NEURITIS, UNSPECIFIED: ICD-10-CM

## 2025-02-06 DIAGNOSIS — M47.817 SPONDYLOSIS W/OUT MYELOPATHY OR RADICULOPATHY, LUMBOSACRAL REGION: ICD-10-CM

## 2025-02-06 DIAGNOSIS — M51.34 OTHER INTERVERTEBRAL DISC DEGENERATION, THORACIC REGION: ICD-10-CM

## 2025-02-06 DIAGNOSIS — M96.1 POSTLAMINECTOMY SYNDROME, NOT ELSEWHERE CLASSIFIED: ICD-10-CM

## 2025-02-06 DIAGNOSIS — G89.4 CHRONIC PAIN SYNDROME: ICD-10-CM

## 2025-02-06 DIAGNOSIS — M54.16 RADICULOPATHY, LUMBAR REGION: ICD-10-CM

## 2025-02-06 DIAGNOSIS — M79.18 MYALGIA, OTHER SITE: ICD-10-CM

## 2025-02-06 PROCEDURE — G2211 COMPLEX E/M VISIT ADD ON: CPT | Mod: 2W

## 2025-02-06 PROCEDURE — 99214 OFFICE O/P EST MOD 30 MIN: CPT | Mod: 2W

## 2025-02-06 RX ORDER — METHOCARBAMOL 500 MG/1
500 TABLET, FILM COATED ORAL
Qty: 90 | Refills: 1 | Status: ACTIVE | COMMUNITY
Start: 2025-02-06 | End: 1900-01-01

## 2025-02-12 ENCOUNTER — RX RENEWAL (OUTPATIENT)
Age: 75
End: 2025-02-12

## 2025-03-18 ENCOUNTER — RX RENEWAL (OUTPATIENT)
Age: 75
End: 2025-03-18

## 2025-06-24 ENCOUNTER — TRANSCRIPTION ENCOUNTER (OUTPATIENT)
Age: 75
End: 2025-06-24

## 2025-06-24 ENCOUNTER — APPOINTMENT (OUTPATIENT)
Dept: PAIN MANAGEMENT | Facility: HOSPITAL | Age: 75
End: 2025-06-24

## 2025-06-24 RX ORDER — CEPHALEXIN 500 MG/1
500 TABLET ORAL
Qty: 10 | Refills: 0 | Status: ACTIVE | COMMUNITY
Start: 2025-06-24 | End: 1900-01-01

## 2025-06-24 RX ORDER — CEPHALEXIN 500 MG/1
500 CAPSULE ORAL TWICE DAILY
Qty: 10 | Refills: 0 | Status: ACTIVE | COMMUNITY
Start: 2025-06-24 | End: 1900-01-01

## 2025-06-24 RX ORDER — LIDOCAINE AND PRILOCAINE 25; 25 MG/G; MG/G
2.5-2.5 CREAM TOPICAL EVERY 4 HOURS
Qty: 1 | Refills: 1 | Status: ACTIVE | COMMUNITY
Start: 2025-06-24 | End: 1900-01-01

## 2025-06-27 ENCOUNTER — APPOINTMENT (OUTPATIENT)
Dept: PAIN MANAGEMENT | Facility: CLINIC | Age: 75
End: 2025-06-27
Payer: MEDICARE

## 2025-06-27 PROCEDURE — 99024 POSTOP FOLLOW-UP VISIT: CPT

## 2025-07-08 ENCOUNTER — NON-APPOINTMENT (OUTPATIENT)
Age: 75
End: 2025-07-08

## 2025-07-09 ENCOUNTER — APPOINTMENT (OUTPATIENT)
Dept: NEUROSURGERY | Facility: CLINIC | Age: 75
End: 2025-07-09
Payer: MEDICARE

## 2025-07-09 VITALS
WEIGHT: 172 LBS | DIASTOLIC BLOOD PRESSURE: 68 MMHG | SYSTOLIC BLOOD PRESSURE: 104 MMHG | HEART RATE: 68 BPM | HEIGHT: 72 IN | OXYGEN SATURATION: 96 % | BODY MASS INDEX: 23.3 KG/M2

## 2025-07-09 PROBLEM — S22.080A CLOSED COMPRESSION FRACTURE OF THORACOLUMBAR VERTEBRA, INITIAL ENCOUNTER: Status: ACTIVE | Noted: 2025-07-09

## 2025-07-09 PROBLEM — M81.0 AGE RELATED OSTEOPOROSIS, UNSPECIFIED PATHOLOGICAL FRACTURE PRESENCE: Status: ACTIVE | Noted: 2025-07-09

## 2025-07-09 PROCEDURE — 99205 OFFICE O/P NEW HI 60 MIN: CPT

## 2025-07-10 ENCOUNTER — RESULT REVIEW (OUTPATIENT)
Age: 75
End: 2025-07-10

## 2025-07-15 ENCOUNTER — APPOINTMENT (OUTPATIENT)
Dept: HEMATOLOGY ONCOLOGY | Facility: CLINIC | Age: 75
End: 2025-07-15
Payer: MEDICARE

## 2025-07-15 ENCOUNTER — RESULT REVIEW (OUTPATIENT)
Age: 75
End: 2025-07-15

## 2025-07-15 VITALS
HEIGHT: 72 IN | BODY MASS INDEX: 23.7 KG/M2 | WEIGHT: 175 LBS | SYSTOLIC BLOOD PRESSURE: 106 MMHG | TEMPERATURE: 97.5 F | DIASTOLIC BLOOD PRESSURE: 70 MMHG | HEART RATE: 70 BPM | OXYGEN SATURATION: 95 % | RESPIRATION RATE: 16 BRPM

## 2025-07-15 PROBLEM — Z01.810 ENCOUNTER FOR PRE-OPERATIVE CARDIOVASCULAR CLEARANCE: Status: ACTIVE | Noted: 2025-07-15 | Resolved: 2025-07-29

## 2025-07-15 PROCEDURE — G2211 COMPLEX E/M VISIT ADD ON: CPT

## 2025-07-15 PROCEDURE — 99214 OFFICE O/P EST MOD 30 MIN: CPT

## 2025-07-22 ENCOUNTER — RESULT REVIEW (OUTPATIENT)
Age: 75
End: 2025-07-22

## 2025-07-24 ENCOUNTER — RESULT REVIEW (OUTPATIENT)
Age: 75
End: 2025-07-24

## 2025-07-28 ENCOUNTER — RESULT REVIEW (OUTPATIENT)
Age: 75
End: 2025-07-28

## 2025-07-28 ENCOUNTER — APPOINTMENT (OUTPATIENT)
Dept: HEMATOLOGY ONCOLOGY | Facility: CLINIC | Age: 75
End: 2025-07-28
Payer: MEDICARE

## 2025-07-28 VITALS
HEIGHT: 72 IN | SYSTOLIC BLOOD PRESSURE: 119 MMHG | HEART RATE: 67 BPM | DIASTOLIC BLOOD PRESSURE: 77 MMHG | OXYGEN SATURATION: 95 % | RESPIRATION RATE: 16 BRPM | TEMPERATURE: 97.8 F | WEIGHT: 177 LBS | BODY MASS INDEX: 23.98 KG/M2

## 2025-07-28 DIAGNOSIS — R79.1 ABNORMAL COAGULATION PROFILE: ICD-10-CM

## 2025-07-28 PROCEDURE — 99215 OFFICE O/P EST HI 40 MIN: CPT

## 2025-07-28 PROCEDURE — G2211 COMPLEX E/M VISIT ADD ON: CPT

## 2025-07-29 DIAGNOSIS — S22.000A WEDGE COMPRESSION FRACTURE OF UNSPECIFIED THORACIC VERTEBRA, INITIAL ENCOUNTER FOR CLOSED FRACTURE: ICD-10-CM

## 2025-07-30 ENCOUNTER — NON-APPOINTMENT (OUTPATIENT)
Age: 75
End: 2025-07-30

## 2025-07-30 PROBLEM — R79.1 ABNORMALLY PROLONGED CLOTTING TIME: Status: ACTIVE | Noted: 2025-07-30

## 2025-07-30 PROBLEM — R79.1 PROLONGED PTT: Status: ACTIVE | Noted: 2025-07-28

## 2025-08-01 ENCOUNTER — APPOINTMENT (OUTPATIENT)
Dept: NEUROSURGERY | Facility: HOSPITAL | Age: 75
End: 2025-08-01

## 2025-08-12 ENCOUNTER — APPOINTMENT (OUTPATIENT)
Dept: PAIN MANAGEMENT | Facility: HOSPITAL | Age: 75
End: 2025-08-12

## 2025-08-12 ENCOUNTER — RESULT REVIEW (OUTPATIENT)
Age: 75
End: 2025-08-12

## 2025-08-12 ENCOUNTER — TRANSCRIPTION ENCOUNTER (OUTPATIENT)
Age: 75
End: 2025-08-12

## 2025-08-18 ENCOUNTER — RESULT REVIEW (OUTPATIENT)
Age: 75
End: 2025-08-18

## 2025-08-26 ENCOUNTER — APPOINTMENT (OUTPATIENT)
Dept: PAIN MANAGEMENT | Facility: CLINIC | Age: 75
End: 2025-08-26

## 2025-08-26 VITALS
HEIGHT: 72 IN | SYSTOLIC BLOOD PRESSURE: 126 MMHG | BODY MASS INDEX: 23.98 KG/M2 | DIASTOLIC BLOOD PRESSURE: 91 MMHG | WEIGHT: 177 LBS

## 2025-08-26 DIAGNOSIS — M81.0 AGE-RELATED OSTEOPOROSIS W/OUT CURRENT PATHOLOGICAL FRACTURE: ICD-10-CM

## 2025-08-26 PROCEDURE — G2211 COMPLEX E/M VISIT ADD ON: CPT

## 2025-08-26 PROCEDURE — 99214 OFFICE O/P EST MOD 30 MIN: CPT

## 2025-08-29 ENCOUNTER — RESULT REVIEW (OUTPATIENT)
Age: 75
End: 2025-08-29

## 2025-09-18 ENCOUNTER — TRANSCRIPTION ENCOUNTER (OUTPATIENT)
Age: 75
End: 2025-09-18

## 2025-09-18 ENCOUNTER — RESULT REVIEW (OUTPATIENT)
Age: 75
End: 2025-09-18

## 2025-09-18 ENCOUNTER — APPOINTMENT (OUTPATIENT)
Dept: PAIN MANAGEMENT | Facility: HOSPITAL | Age: 75
End: 2025-09-18

## (undated) DEVICE — TIP METZENBAUM SCISSOR MONOPOLAR ENDOCUT (ORANGE)

## (undated) DEVICE — TUBING AIRSEAL TRI-LUMEN FILTERED

## (undated) DEVICE — XI TIP COVER

## (undated) DEVICE — FOLEY CATH 2-WAY 18FR 5CC LATEX HYDROGEL

## (undated) DEVICE — XI ARM FORCEP PROGRASP 8MM

## (undated) DEVICE — DRAIN JACKSON PRATT 10MM FLAT 3/4 NO TROCAR

## (undated) DEVICE — DRSG DERMABOND 0.7ML

## (undated) DEVICE — TROCAR SURGIQUEST AIRSEAL 8MMX100MM

## (undated) DEVICE — DRAINAGE BAG URINARY 2L

## (undated) DEVICE — SUT VICRYL 0 54" TIES

## (undated) DEVICE — XI ARM NEEDLE DRIVER LARGE

## (undated) DEVICE — FOLEY CATH 2-WAY 20FR 5CC LATEX COUNCIL RED

## (undated) DEVICE — SUT VLOC 180 2-0 9" GS-22 GREEN

## (undated) DEVICE — XI SEAL UNIV 5- 8 MM

## (undated) DEVICE — TROCAR COVIDIEN VERSAPORT BLADELESS OPTICAL 5MM STANDARD

## (undated) DEVICE — XI OBTURATOR OPTICAL BLADELESS 8MM

## (undated) DEVICE — SUT VLOC 180 3-0 6" V-20 GREEN

## (undated) DEVICE — XI DRAPE COLUMN

## (undated) DEVICE — XI ARM FORCEP MARYLAND BIPOLAR

## (undated) DEVICE — XI DRAPE ARM

## (undated) DEVICE — APPLICATOR ENDOSCOPIC FOR SUGIFLO

## (undated) DEVICE — XI ARM SCISSOR MONO CURVED

## (undated) DEVICE — SUT VICRYL 0 27" UR-6

## (undated) DEVICE — D HELP - CLEARVIEW CLEARIFY SYSTEM

## (undated) DEVICE — SUT MONOCRYL 4-0 27" PS-2 UNDYED

## (undated) DEVICE — XI ARM CLIP APPLIER LARGE

## (undated) DEVICE — Device

## (undated) DEVICE — PACK GENERAL LAPAROSCOPY

## (undated) DEVICE — FOLEY HOLDER STATLOCK 2 WAY ADULT

## (undated) DEVICE — XI ARM FORCEP FENESTRATED BIPOLAR 8MM

## (undated) DEVICE — SUT CLIP LAPRA-TY ABSORBABLE SIZE 0.118 TO 0.12" VIOLET

## (undated) DEVICE — SYR CATH TIP 2 OZ

## (undated) DEVICE — GLV 7.5 PROTEXIS (WHITE)

## (undated) DEVICE — DRAIN RESERVOIR FOR JACKSON PRATT 100CC CARDINAL

## (undated) DEVICE — SUT VICRYL 2-0 27" SH

## (undated) DEVICE — ENDOPATH 5MM CVD SCISSOR W MONOPOLAR CAUTERY DISP

## (undated) DEVICE — INSUFFLATION NDL COVIDIEN SURGINEEDLE VERESS 120MM

## (undated) DEVICE — ENDOCATCH 10MM SPECIMEN POUCH